# Patient Record
Sex: MALE | Race: WHITE | NOT HISPANIC OR LATINO | Employment: OTHER | URBAN - METROPOLITAN AREA
[De-identification: names, ages, dates, MRNs, and addresses within clinical notes are randomized per-mention and may not be internally consistent; named-entity substitution may affect disease eponyms.]

---

## 2022-01-01 ENCOUNTER — TELEPHONE (OUTPATIENT)
Dept: FAMILY MEDICINE | Facility: CLINIC | Age: 67
End: 2022-01-01
Payer: MEDICARE

## 2022-01-01 ENCOUNTER — HOSPITAL ENCOUNTER (OUTPATIENT)
Dept: CARDIOLOGY | Facility: HOSPITAL | Age: 67
Discharge: HOME | End: 2022-09-19
Attending: INTERNAL MEDICINE
Payer: MEDICARE

## 2022-01-01 ENCOUNTER — HOSPITAL ENCOUNTER (OUTPATIENT)
Dept: RADIOLOGY | Facility: HOSPITAL | Age: 67
Discharge: HOME | End: 2022-09-19
Attending: INTERNAL MEDICINE
Payer: MEDICARE

## 2022-01-01 ENCOUNTER — DOCUMENT SCAN (OUTPATIENT)
Dept: HOME HEALTH SERVICES | Facility: HOSPITAL | Age: 67
End: 2022-01-01
Payer: MEDICARE

## 2022-01-01 ENCOUNTER — OFFICE VISIT (OUTPATIENT)
Dept: FAMILY MEDICINE | Facility: CLINIC | Age: 67
End: 2022-01-01
Payer: MEDICARE

## 2022-01-01 ENCOUNTER — TELEPHONE (OUTPATIENT)
Dept: CARDIOLOGY | Facility: CLINIC | Age: 67
End: 2022-01-01
Payer: MEDICARE

## 2022-01-01 ENCOUNTER — HOSPITAL ENCOUNTER (OUTPATIENT)
Dept: CARDIOLOGY | Facility: HOSPITAL | Age: 67
Discharge: HOME | End: 2022-08-04
Attending: FAMILY MEDICINE
Payer: MEDICARE

## 2022-01-01 ENCOUNTER — OFFICE VISIT (OUTPATIENT)
Dept: CARDIOLOGY | Facility: CLINIC | Age: 67
End: 2022-01-01
Payer: MEDICARE

## 2022-01-01 ENCOUNTER — LAB VISIT (OUTPATIENT)
Dept: LAB | Facility: HOSPITAL | Age: 67
End: 2022-01-01
Attending: FAMILY MEDICINE
Payer: MEDICARE

## 2022-01-01 ENCOUNTER — HOSPITAL ENCOUNTER (EMERGENCY)
Facility: HOSPITAL | Age: 67
Discharge: HOME | End: 2022-06-09
Attending: EMERGENCY MEDICINE
Payer: MEDICARE

## 2022-01-01 ENCOUNTER — HOSPITAL ENCOUNTER (EMERGENCY)
Facility: HOSPITAL | Age: 67
Discharge: HOME | End: 2022-07-15
Attending: FAMILY MEDICINE
Payer: MEDICARE

## 2022-01-01 ENCOUNTER — TELEPHONE (OUTPATIENT)
Dept: HEPATOLOGY | Facility: CLINIC | Age: 67
End: 2022-01-01
Payer: MEDICARE

## 2022-01-01 ENCOUNTER — TELEPHONE (OUTPATIENT)
Dept: RHEUMATOLOGY | Facility: CLINIC | Age: 67
End: 2022-01-01
Payer: MEDICARE

## 2022-01-01 ENCOUNTER — EXTERNAL HOME HEALTH (OUTPATIENT)
Dept: HOME HEALTH SERVICES | Facility: HOSPITAL | Age: 67
End: 2022-01-01
Payer: MEDICARE

## 2022-01-01 ENCOUNTER — HOSPITAL ENCOUNTER (OUTPATIENT)
Dept: RADIOLOGY | Facility: HOSPITAL | Age: 67
Discharge: HOME | End: 2022-09-15
Attending: FAMILY MEDICINE
Payer: MEDICARE

## 2022-01-01 VITALS
BODY MASS INDEX: 21.43 KG/M2 | WEIGHT: 141.38 LBS | HEART RATE: 71 BPM | DIASTOLIC BLOOD PRESSURE: 88 MMHG | SYSTOLIC BLOOD PRESSURE: 138 MMHG | RESPIRATION RATE: 15 BRPM | OXYGEN SATURATION: 96 % | HEIGHT: 68 IN

## 2022-01-01 VITALS
OXYGEN SATURATION: 94 % | SYSTOLIC BLOOD PRESSURE: 134 MMHG | HEIGHT: 68 IN | BODY MASS INDEX: 21.65 KG/M2 | SYSTOLIC BLOOD PRESSURE: 128 MMHG | DIASTOLIC BLOOD PRESSURE: 72 MMHG | SYSTOLIC BLOOD PRESSURE: 135 MMHG | OXYGEN SATURATION: 79 % | DIASTOLIC BLOOD PRESSURE: 77 MMHG | BODY MASS INDEX: 21.44 KG/M2 | HEART RATE: 74 BPM | HEART RATE: 81 BPM | DIASTOLIC BLOOD PRESSURE: 87 MMHG | RESPIRATION RATE: 15 BRPM | WEIGHT: 142.88 LBS | WEIGHT: 141.5 LBS | HEART RATE: 79 BPM | HEIGHT: 68 IN

## 2022-01-01 VITALS
SYSTOLIC BLOOD PRESSURE: 135 MMHG | WEIGHT: 142 LBS | HEART RATE: 72 BPM | BODY MASS INDEX: 21.52 KG/M2 | OXYGEN SATURATION: 98 % | DIASTOLIC BLOOD PRESSURE: 79 MMHG | RESPIRATION RATE: 15 BRPM | HEIGHT: 68 IN

## 2022-01-01 VITALS
BODY MASS INDEX: 23.49 KG/M2 | HEART RATE: 97 BPM | OXYGEN SATURATION: 96 % | TEMPERATURE: 100 F | HEIGHT: 68 IN | SYSTOLIC BLOOD PRESSURE: 126 MMHG | WEIGHT: 155 LBS | RESPIRATION RATE: 19 BRPM | DIASTOLIC BLOOD PRESSURE: 72 MMHG

## 2022-01-01 VITALS
RESPIRATION RATE: 15 BRPM | HEIGHT: 68 IN | BODY MASS INDEX: 21.77 KG/M2 | SYSTOLIC BLOOD PRESSURE: 133 MMHG | DIASTOLIC BLOOD PRESSURE: 69 MMHG | WEIGHT: 143.63 LBS | HEART RATE: 104 BPM | OXYGEN SATURATION: 99 %

## 2022-01-01 VITALS
HEART RATE: 78 BPM | OXYGEN SATURATION: 99 % | DIASTOLIC BLOOD PRESSURE: 89 MMHG | TEMPERATURE: 98 F | WEIGHT: 160 LBS | HEIGHT: 69 IN | SYSTOLIC BLOOD PRESSURE: 165 MMHG | RESPIRATION RATE: 20 BRPM | BODY MASS INDEX: 23.7 KG/M2

## 2022-01-01 VITALS — HEIGHT: 68 IN | WEIGHT: 143 LBS | BODY MASS INDEX: 21.67 KG/M2

## 2022-01-01 VITALS
HEART RATE: 89 BPM | WEIGHT: 144.69 LBS | OXYGEN SATURATION: 96 % | DIASTOLIC BLOOD PRESSURE: 68 MMHG | SYSTOLIC BLOOD PRESSURE: 138 MMHG | BODY MASS INDEX: 21.93 KG/M2 | RESPIRATION RATE: 15 BRPM | HEIGHT: 68 IN

## 2022-01-01 VITALS
HEART RATE: 78 BPM | DIASTOLIC BLOOD PRESSURE: 78 MMHG | SYSTOLIC BLOOD PRESSURE: 134 MMHG | WEIGHT: 143.5 LBS | OXYGEN SATURATION: 98 % | HEIGHT: 68 IN | RESPIRATION RATE: 16 BRPM | BODY MASS INDEX: 21.75 KG/M2

## 2022-01-01 DIAGNOSIS — I49.9 CARDIAC ARRHYTHMIA, UNSPECIFIED CARDIAC ARRHYTHMIA TYPE: ICD-10-CM

## 2022-01-01 DIAGNOSIS — Z86.711 HISTORY OF PULMONARY EMBOLISM: ICD-10-CM

## 2022-01-01 DIAGNOSIS — Z76.0 MEDICATION REFILL: ICD-10-CM

## 2022-01-01 DIAGNOSIS — I10 PRIMARY HYPERTENSION: ICD-10-CM

## 2022-01-01 DIAGNOSIS — D50.0 IRON DEFICIENCY ANEMIA DUE TO CHRONIC BLOOD LOSS: ICD-10-CM

## 2022-01-01 DIAGNOSIS — K75.9 HEPATITIS: ICD-10-CM

## 2022-01-01 DIAGNOSIS — R73.9 HYPERGLYCEMIA: ICD-10-CM

## 2022-01-01 DIAGNOSIS — J40 BRONCHITIS: ICD-10-CM

## 2022-01-01 DIAGNOSIS — M25.50 ARTHRALGIA, UNSPECIFIED JOINT: ICD-10-CM

## 2022-01-01 DIAGNOSIS — I73.9 CLAUDICATION: ICD-10-CM

## 2022-01-01 DIAGNOSIS — Z11.59 NEED FOR HEPATITIS C SCREENING TEST: ICD-10-CM

## 2022-01-01 DIAGNOSIS — J32.9 SINUSITIS, UNSPECIFIED CHRONICITY, UNSPECIFIED LOCATION: ICD-10-CM

## 2022-01-01 DIAGNOSIS — Z12.11 COLON CANCER SCREENING: ICD-10-CM

## 2022-01-01 DIAGNOSIS — B30.9 VIRAL CONJUNCTIVITIS OF BOTH EYES: Primary | ICD-10-CM

## 2022-01-01 DIAGNOSIS — E78.5 DYSLIPIDEMIA: ICD-10-CM

## 2022-01-01 DIAGNOSIS — J42 CHRONIC BRONCHITIS, UNSPECIFIED CHRONIC BRONCHITIS TYPE: ICD-10-CM

## 2022-01-01 DIAGNOSIS — I34.0 NONRHEUMATIC MITRAL VALVE REGURGITATION: ICD-10-CM

## 2022-01-01 DIAGNOSIS — R63.4 WEIGHT LOSS, UNINTENTIONAL: ICD-10-CM

## 2022-01-01 DIAGNOSIS — B18.2 CHRONIC HEPATITIS C WITHOUT HEPATIC COMA: ICD-10-CM

## 2022-01-01 DIAGNOSIS — Z91.89 AT RISK FOR CARDIOVASCULAR EVENT: ICD-10-CM

## 2022-01-01 DIAGNOSIS — I10 PRIMARY HYPERTENSION: Primary | ICD-10-CM

## 2022-01-01 DIAGNOSIS — Z02.83 ENCOUNTER FOR DRUG SCREENING: ICD-10-CM

## 2022-01-01 DIAGNOSIS — Z87.891 HISTORY OF SMOKING GREATER THAN 50 PACK YEARS: ICD-10-CM

## 2022-01-01 DIAGNOSIS — M06.9 RHEUMATOID ARTHRITIS, INVOLVING UNSPECIFIED SITE, UNSPECIFIED WHETHER RHEUMATOID FACTOR PRESENT: Primary | ICD-10-CM

## 2022-01-01 DIAGNOSIS — D64.9 ANEMIA, UNSPECIFIED TYPE: ICD-10-CM

## 2022-01-01 DIAGNOSIS — R06.02 SOB (SHORTNESS OF BREATH): ICD-10-CM

## 2022-01-01 DIAGNOSIS — R29.6 MULTIPLE FALLS: ICD-10-CM

## 2022-01-01 DIAGNOSIS — B18.2 CHRONIC HEPATITIS C WITHOUT HEPATIC COMA: Primary | ICD-10-CM

## 2022-01-01 DIAGNOSIS — F11.20 UNCOMPLICATED OPIOID DEPENDENCE: ICD-10-CM

## 2022-01-01 DIAGNOSIS — U07.1 COVID-19: ICD-10-CM

## 2022-01-01 DIAGNOSIS — R91.8 LUNG MASS: ICD-10-CM

## 2022-01-01 DIAGNOSIS — G47.19 EXCESSIVE DAYTIME SLEEPINESS: ICD-10-CM

## 2022-01-01 DIAGNOSIS — Z79.01 LONG TERM (CURRENT) USE OF ANTICOAGULANTS: ICD-10-CM

## 2022-01-01 DIAGNOSIS — R94.31 NONSPECIFIC ABNORMAL ELECTROCARDIOGRAM (ECG) (EKG): ICD-10-CM

## 2022-01-01 DIAGNOSIS — I73.9 CLAUDICATION: Primary | ICD-10-CM

## 2022-01-01 DIAGNOSIS — M05.79 RHEUMATOID ARTHRITIS INVOLVING MULTIPLE SITES WITH POSITIVE RHEUMATOID FACTOR: ICD-10-CM

## 2022-01-01 DIAGNOSIS — Z91.89 SEDENTARY LIFESTYLE: ICD-10-CM

## 2022-01-01 DIAGNOSIS — R06.02 SHORTNESS OF BREATH: ICD-10-CM

## 2022-01-01 DIAGNOSIS — J44.1 COPD EXACERBATION: Primary | ICD-10-CM

## 2022-01-01 DIAGNOSIS — J06.9 UPPER RESPIRATORY TRACT INFECTION, UNSPECIFIED TYPE: ICD-10-CM

## 2022-01-01 DIAGNOSIS — F32.A DEPRESSION, UNSPECIFIED DEPRESSION TYPE: Primary | ICD-10-CM

## 2022-01-01 DIAGNOSIS — F32.A DEPRESSION, UNSPECIFIED DEPRESSION TYPE: ICD-10-CM

## 2022-01-01 DIAGNOSIS — Z82.49 FAMILY HISTORY OF PREMATURE CAD: ICD-10-CM

## 2022-01-01 DIAGNOSIS — I49.9 CARDIAC ARRHYTHMIA, UNSPECIFIED CARDIAC ARRHYTHMIA TYPE: Primary | ICD-10-CM

## 2022-01-01 LAB
ALBUMIN SERPL BCP-MCNC: 3.2 G/DL (ref 3.5–5.2)
ALBUMIN SERPL BCP-MCNC: 3.5 G/DL (ref 3.5–5.2)
ALBUMIN SERPL BCP-MCNC: 3.6 G/DL (ref 3.5–5.2)
ALP SERPL-CCNC: 102 U/L (ref 55–135)
ALP SERPL-CCNC: 93 U/L (ref 55–135)
ALP SERPL-CCNC: 99 U/L (ref 55–135)
ALT SERPL W/O P-5'-P-CCNC: 37 U/L (ref 10–44)
ALT SERPL W/O P-5'-P-CCNC: 40 U/L (ref 10–44)
ALT SERPL W/O P-5'-P-CCNC: 52 U/L (ref 10–44)
ANION GAP SERPL CALC-SCNC: 12 MMOL/L (ref 8–16)
ANION GAP SERPL CALC-SCNC: 13 MMOL/L (ref 8–16)
ANION GAP SERPL CALC-SCNC: 18 MMOL/L (ref 8–16)
AORTIC ROOT ANNULUS: 3.01 CM
AST SERPL-CCNC: 37 U/L (ref 10–40)
AST SERPL-CCNC: 39 U/L (ref 10–40)
AST SERPL-CCNC: 49 U/L (ref 10–40)
AV INDEX (PROSTH): 0.7
AV MEAN GRADIENT: 8 MMHG
AV PEAK GRADIENT: 16 MMHG
AV VELOCITY RATIO: 0.73
BACTERIA BLD CULT: NORMAL
BACTERIA BLD CULT: NORMAL
BASOPHILS # BLD AUTO: 0.06 K/UL (ref 0–0.2)
BASOPHILS # BLD AUTO: 0.07 K/UL (ref 0–0.2)
BASOPHILS # BLD AUTO: 0.07 K/UL (ref 0–0.2)
BASOPHILS NFR BLD: 0.4 % (ref 0–1.9)
BASOPHILS NFR BLD: 0.6 % (ref 0–1.9)
BASOPHILS NFR BLD: 0.7 % (ref 0–1.9)
BILIRUB SERPL-MCNC: 0.3 MG/DL (ref 0.1–1)
BILIRUB SERPL-MCNC: 0.4 MG/DL (ref 0.1–1)
BILIRUB SERPL-MCNC: 0.7 MG/DL (ref 0.1–1)
BNP SERPL-MCNC: 82 PG/ML (ref 0–99)
BSA FOR ECHO PROCEDURE: 1.76 M2
BUN SERPL-MCNC: 6 MG/DL (ref 8–23)
BUN SERPL-MCNC: 8 MG/DL (ref 8–23)
BUN SERPL-MCNC: 8 MG/DL (ref 8–23)
CALCIUM SERPL-MCNC: 9.3 MG/DL (ref 8.7–10.5)
CALCIUM SERPL-MCNC: 9.6 MG/DL (ref 8.7–10.5)
CALCIUM SERPL-MCNC: 9.7 MG/DL (ref 8.7–10.5)
CHLORIDE SERPL-SCNC: 102 MMOL/L (ref 95–110)
CHLORIDE SERPL-SCNC: 91 MMOL/L (ref 95–110)
CHLORIDE SERPL-SCNC: 95 MMOL/L (ref 95–110)
CHOLEST SERPL-MCNC: 169 MG/DL (ref 120–199)
CHOLEST/HDLC SERPL: 4.1 {RATIO} (ref 2–5)
CO2 SERPL-SCNC: 22 MMOL/L (ref 23–29)
CO2 SERPL-SCNC: 22 MMOL/L (ref 23–29)
CO2 SERPL-SCNC: 23 MMOL/L (ref 23–29)
CREAT SERPL-MCNC: 0.7 MG/DL (ref 0.5–1.4)
CREAT SERPL-MCNC: 0.8 MG/DL (ref 0.5–1.4)
CREAT SERPL-MCNC: 0.8 MG/DL (ref 0.5–1.4)
CRP SERPL-MCNC: 29.6 MG/L (ref 0–8.2)
CV ECHO LV RWT: 0.43 CM
CV STRESS BASE HR: 68 BPM
DIASTOLIC BLOOD PRESSURE: 76 MMHG
DIFFERENTIAL METHOD: ABNORMAL
DOP CALC AO PEAK VEL: 1.98 M/S
DOP CALC AO VTI: 46.97 CM
DOP CALC LVOT PEAK VEL: 1.45 M/S
DOP CALCLVOT PEAK VEL VTI: 33.05 CM
E WAVE DECELERATION TIME: 156.43 MSEC
ECHO LV POSTERIOR WALL: 0.98 CM (ref 0.6–1.1)
EJECTION FRACTION- HIGH: 65 %
EJECTION FRACTION: 56 %
END DIASTOLIC INDEX-HIGH: 153 ML/M2
END DIASTOLIC INDEX-LOW: 93 ML/M2
END SYSTOLIC INDEX-HIGH: 71 ML/M2
END SYSTOLIC INDEX-LOW: 31 ML/M2
EOSINOPHIL # BLD AUTO: 1.7 K/UL (ref 0–0.5)
EOSINOPHIL # BLD AUTO: 2 K/UL (ref 0–0.5)
EOSINOPHIL # BLD AUTO: 2.3 K/UL (ref 0–0.5)
EOSINOPHIL NFR BLD: 20.8 % (ref 0–8)
EOSINOPHIL NFR BLD: 23.1 % (ref 0–8)
EOSINOPHIL NFR BLD: 9.1 % (ref 0–8)
ERYTHROCYTE [DISTWIDTH] IN BLOOD BY AUTOMATED COUNT: 13.2 % (ref 11.5–14.5)
ERYTHROCYTE [DISTWIDTH] IN BLOOD BY AUTOMATED COUNT: 14.1 % (ref 11.5–14.5)
ERYTHROCYTE [DISTWIDTH] IN BLOOD BY AUTOMATED COUNT: 14.3 % (ref 11.5–14.5)
ERYTHROCYTE [SEDIMENTATION RATE] IN BLOOD BY WESTERGREN METHOD: >90 MM/HR (ref 0–10)
EST. GFR  (AFRICAN AMERICAN): >60 ML/MIN/1.73 M^2
EST. GFR  (NON AFRICAN AMERICAN): >60 ML/MIN/1.73 M^2
ESTIMATED AVG GLUCOSE: 111 MG/DL (ref 68–131)
FRACTIONAL SHORTENING: 29 % (ref 28–44)
GLUCOSE SERPL-MCNC: 101 MG/DL (ref 70–110)
GLUCOSE SERPL-MCNC: 119 MG/DL (ref 70–110)
GLUCOSE SERPL-MCNC: 82 MG/DL (ref 70–110)
HBA1C MFR BLD: 5.5 % (ref 4–5.6)
HBV DNA SERPL QL NAA+PROBE: NOT DETECTED
HCT VFR BLD AUTO: 35.3 % (ref 40–54)
HCT VFR BLD AUTO: 38.3 % (ref 40–54)
HCT VFR BLD AUTO: 38.7 % (ref 40–54)
HCV AB SERPL QL IA: POSITIVE
HCV RNA SERPL NAA+PROBE-ACNC: ABNORMAL IU/ML
HDLC SERPL-MCNC: 41 MG/DL (ref 40–75)
HDLC SERPL: 24.3 % (ref 20–50)
HGB BLD-MCNC: 12.3 G/DL (ref 14–18)
HGB BLD-MCNC: 12.8 G/DL (ref 14–18)
HGB BLD-MCNC: 13.3 G/DL (ref 14–18)
IMM GRANULOCYTES # BLD AUTO: 0.02 K/UL (ref 0–0.04)
IMM GRANULOCYTES # BLD AUTO: 0.02 K/UL (ref 0–0.04)
IMM GRANULOCYTES # BLD AUTO: 0.09 K/UL (ref 0–0.04)
IMM GRANULOCYTES NFR BLD AUTO: 0.2 % (ref 0–0.5)
IMM GRANULOCYTES NFR BLD AUTO: 0.2 % (ref 0–0.5)
IMM GRANULOCYTES NFR BLD AUTO: 0.5 % (ref 0–0.5)
INTERVENTRICULAR SEPTUM: 0.96 CM (ref 0.6–1.1)
LACTATE SERPL-SCNC: 1.3 MMOL/L (ref 0.5–2.2)
LDLC SERPL CALC-MCNC: 113 MG/DL (ref 63–159)
LEFT ATRIUM SIZE: 4.12 CM
LEFT INTERNAL DIMENSION IN SYSTOLE: 3.25 CM (ref 2.1–4)
LEFT VENTRICLE DIASTOLIC VOLUME INDEX: 54.38 ML/M2
LEFT VENTRICLE DIASTOLIC VOLUME: 96.25 ML
LEFT VENTRICLE MASS INDEX: 85 G/M2
LEFT VENTRICLE SYSTOLIC VOLUME INDEX: 24 ML/M2
LEFT VENTRICLE SYSTOLIC VOLUME: 42.41 ML
LEFT VENTRICULAR INTERNAL DIMENSION IN DIASTOLE: 4.58 CM (ref 3.5–6)
LEFT VENTRICULAR MASS: 151.27 G
LYMPHOCYTES # BLD AUTO: 1 K/UL (ref 1–4.8)
LYMPHOCYTES # BLD AUTO: 1.3 K/UL (ref 1–4.8)
LYMPHOCYTES # BLD AUTO: 2 K/UL (ref 1–4.8)
LYMPHOCYTES NFR BLD: 12.1 % (ref 18–48)
LYMPHOCYTES NFR BLD: 22.6 % (ref 18–48)
LYMPHOCYTES NFR BLD: 5.6 % (ref 18–48)
MCH RBC QN AUTO: 29.4 PG (ref 27–31)
MCH RBC QN AUTO: 29.6 PG (ref 27–31)
MCH RBC QN AUTO: 30.2 PG (ref 27–31)
MCHC RBC AUTO-ENTMCNC: 33.4 G/DL (ref 32–36)
MCHC RBC AUTO-ENTMCNC: 34.4 G/DL (ref 32–36)
MCHC RBC AUTO-ENTMCNC: 34.8 G/DL (ref 32–36)
MCV RBC AUTO: 85 FL (ref 82–98)
MCV RBC AUTO: 88 FL (ref 82–98)
MCV RBC AUTO: 88 FL (ref 82–98)
MONOCYTES # BLD AUTO: 0.7 K/UL (ref 0.3–1)
MONOCYTES # BLD AUTO: 1.1 K/UL (ref 0.3–1)
MONOCYTES # BLD AUTO: 1.3 K/UL (ref 0.3–1)
MONOCYTES NFR BLD: 12.5 % (ref 4–15)
MONOCYTES NFR BLD: 6.8 % (ref 4–15)
MONOCYTES NFR BLD: 7.1 % (ref 4–15)
MV STENOSIS PRESSURE HALF TIME: 45.36 MS
MV VALVE AREA P 1/2 METHOD: 4.85 CM2
NEUTROPHILS # BLD AUTO: 14.2 K/UL (ref 1.8–7.7)
NEUTROPHILS # BLD AUTO: 3.6 K/UL (ref 1.8–7.7)
NEUTROPHILS # BLD AUTO: 6.5 K/UL (ref 1.8–7.7)
NEUTROPHILS NFR BLD: 40.9 % (ref 38–73)
NEUTROPHILS NFR BLD: 59.5 % (ref 38–73)
NEUTROPHILS NFR BLD: 77.3 % (ref 38–73)
NONHDLC SERPL-MCNC: 128 MG/DL
NRBC BLD-RTO: 0 /100 WBC
NUC REST DIASTOLIC VOLUME INDEX: 70
NUC REST EJECTION FRACTION: 61
NUC REST SYSTOLIC VOLUME INDEX: 27
NUC STRESS DIASTOLIC VOLUME INDEX: 68
NUC STRESS EJECTION FRACTION: 65 %
NUC STRESS SYSTOLIC VOLUME INDEX: 24
OHS CV CPX 85 PERCENT MAX PREDICTED HEART RATE MALE: 129
OHS CV CPX MAX PREDICTED HEART RATE: 152
OHS CV CPX PATIENT IS FEMALE: 0
OHS CV CPX PATIENT IS MALE: 1
OHS CV CPX PEAK DIASTOLIC BLOOD PRESSURE: 80 MMHG
OHS CV CPX PEAK HEAR RATE: 82 BPM
OHS CV CPX PEAK RATE PRESSURE PRODUCT: NORMAL
OHS CV CPX PEAK SYSTOLIC BLOOD PRESSURE: 134 MMHG
OHS CV CPX PERCENT MAX PREDICTED HEART RATE ACHIEVED: 54
OHS CV CPX RATE PRESSURE PRODUCT PRESENTING: 8976
OHS CV EVENT MONITOR DAY: 0
OHS CV HOLTER LENGTH DECIMAL HOURS: 47.98
OHS CV HOLTER LENGTH HOURS: 47
OHS CV HOLTER LENGTH MINUTES: 59
OHS CV HOLTER SINUS AVERAGE HR: 71
OHS CV HOLTER SINUS MAX HR: 111
OHS CV HOLTER SINUS MIN HR: 54
PISA MRMAX VEL: 0.05 M/S
PLATELET # BLD AUTO: 159 K/UL (ref 150–450)
PLATELET # BLD AUTO: 250 K/UL (ref 150–450)
PLATELET # BLD AUTO: 374 K/UL (ref 150–450)
PMV BLD AUTO: 8.4 FL (ref 9.2–12.9)
PMV BLD AUTO: 8.6 FL (ref 9.2–12.9)
PMV BLD AUTO: 9 FL (ref 9.2–12.9)
POTASSIUM SERPL-SCNC: 3.5 MMOL/L (ref 3.5–5.1)
POTASSIUM SERPL-SCNC: 3.8 MMOL/L (ref 3.5–5.1)
POTASSIUM SERPL-SCNC: 4 MMOL/L (ref 3.5–5.1)
PROT SERPL-MCNC: 7.8 G/DL (ref 6–8.4)
PROT SERPL-MCNC: 8.4 G/DL (ref 6–8.4)
PROT SERPL-MCNC: 8.9 G/DL (ref 6–8.4)
PV PEAK VELOCITY: 0.81 CM/S
RA PRESSURE: 3 MMHG
RBC # BLD AUTO: 4.15 M/UL (ref 4.6–6.2)
RBC # BLD AUTO: 4.35 M/UL (ref 4.6–6.2)
RBC # BLD AUTO: 4.41 M/UL (ref 4.6–6.2)
RETIRED EF AND QEF - SEE NOTES: 53 %
RHEUMATOID FACT SERPL-ACNC: 295 IU/ML (ref 0–15)
SARS-COV-2 RDRP RESP QL NAA+PROBE: NEGATIVE
SARS-COV-2 RDRP RESP QL NAA+PROBE: NEGATIVE
SODIUM SERPL-SCNC: 127 MMOL/L (ref 136–145)
SODIUM SERPL-SCNC: 135 MMOL/L (ref 136–145)
SODIUM SERPL-SCNC: 136 MMOL/L (ref 136–145)
SYSTOLIC BLOOD PRESSURE: 132 MMHG
T4 FREE SERPL-MCNC: 1.06 NG/DL (ref 0.71–1.51)
TRIGL SERPL-MCNC: 75 MG/DL (ref 30–150)
TSH SERPL DL<=0.005 MIU/L-ACNC: 2.51 UIU/ML (ref 0.4–4)
URATE SERPL-MCNC: 2.2 MG/DL (ref 3.4–7)
WBC # BLD AUTO: 10.89 K/UL (ref 3.9–12.7)
WBC # BLD AUTO: 18.32 K/UL (ref 3.9–12.7)
WBC # BLD AUTO: 8.7 K/UL (ref 3.9–12.7)

## 2022-01-01 PROCEDURE — 96374 THER/PROPH/DIAG INJ IV PUSH: CPT

## 2022-01-01 PROCEDURE — 25000003 PHARM REV CODE 250: Performed by: EMERGENCY MEDICINE

## 2022-01-01 PROCEDURE — 36415 COLL VENOUS BLD VENIPUNCTURE: CPT | Performed by: FAMILY MEDICINE

## 2022-01-01 PROCEDURE — 99205 PR OFFICE/OUTPT VISIT, NEW, LEVL V, 60-74 MIN: ICD-10-PCS | Mod: S$PBB,CR,25, | Performed by: INTERNAL MEDICINE

## 2022-01-01 PROCEDURE — 99999 PR PBB SHADOW E&M-EST. PATIENT-LVL IV: CPT | Mod: PBBFAC,,, | Performed by: FAMILY MEDICINE

## 2022-01-01 PROCEDURE — 99214 PR OFFICE/OUTPT VISIT, EST, LEVL IV, 30-39 MIN: ICD-10-PCS | Mod: S$PBB,,, | Performed by: FAMILY MEDICINE

## 2022-01-01 PROCEDURE — 71045 X-RAY EXAM CHEST 1 VIEW: CPT | Mod: 26,,, | Performed by: RADIOLOGY

## 2022-01-01 PROCEDURE — 99205 OFFICE O/P NEW HI 60 MIN: CPT | Mod: S$PBB,CR,25, | Performed by: INTERNAL MEDICINE

## 2022-01-01 PROCEDURE — 93017 CV STRESS TEST TRACING ONLY: CPT

## 2022-01-01 PROCEDURE — 99204 OFFICE O/P NEW MOD 45 MIN: CPT | Mod: S$PBB,,, | Performed by: FAMILY MEDICINE

## 2022-01-01 PROCEDURE — 93306 TTE W/DOPPLER COMPLETE: CPT

## 2022-01-01 PROCEDURE — 86803 HEPATITIS C AB TEST: CPT | Performed by: FAMILY MEDICINE

## 2022-01-01 PROCEDURE — 99999 PR PBB SHADOW E&M-EST. PATIENT-LVL V: ICD-10-PCS | Mod: PBBFAC,CR,, | Performed by: INTERNAL MEDICINE

## 2022-01-01 PROCEDURE — 93010 EKG 12-LEAD: ICD-10-PCS | Mod: ,,, | Performed by: INTERNAL MEDICINE

## 2022-01-01 PROCEDURE — 87516 HEPATITIS B DNA AMP PROBE: CPT | Performed by: FAMILY MEDICINE

## 2022-01-01 PROCEDURE — 93306 TTE W/DOPPLER COMPLETE: CPT | Mod: 26,,, | Performed by: INTERNAL MEDICINE

## 2022-01-01 PROCEDURE — 93005 ELECTROCARDIOGRAM TRACING: CPT

## 2022-01-01 PROCEDURE — 93225 XTRNL ECG REC<48 HRS REC: CPT

## 2022-01-01 PROCEDURE — 99999 PR PBB SHADOW E&M-EST. PATIENT-LVL V: CPT | Mod: PBBFAC,CR,, | Performed by: INTERNAL MEDICINE

## 2022-01-01 PROCEDURE — 99214 OFFICE O/P EST MOD 30 MIN: CPT | Mod: S$PBB,,, | Performed by: FAMILY MEDICINE

## 2022-01-01 PROCEDURE — U0002 COVID-19 LAB TEST NON-CDC: HCPCS | Performed by: EMERGENCY MEDICINE

## 2022-01-01 PROCEDURE — 96372 THER/PROPH/DIAG INJ SC/IM: CPT | Mod: PBBFAC,PN

## 2022-01-01 PROCEDURE — 99999 PR PBB SHADOW E&M-EST. PATIENT-LVL III: ICD-10-PCS | Mod: PBBFAC,,, | Performed by: FAMILY MEDICINE

## 2022-01-01 PROCEDURE — 93010 EKG 12-LEAD: ICD-10-PCS | Mod: S$PBB,,, | Performed by: INTERNAL MEDICINE

## 2022-01-01 PROCEDURE — 96360 HYDRATION IV INFUSION INIT: CPT

## 2022-01-01 PROCEDURE — 93306 ECHO (CUPID ONLY): ICD-10-PCS | Mod: 26,,, | Performed by: INTERNAL MEDICINE

## 2022-01-01 PROCEDURE — 93016 CV STRESS TEST SUPVJ ONLY: CPT | Mod: ,,, | Performed by: INTERNAL MEDICINE

## 2022-01-01 PROCEDURE — 93005 ELECTROCARDIOGRAM TRACING: CPT | Mod: PBBFAC,PN | Performed by: INTERNAL MEDICINE

## 2022-01-01 PROCEDURE — 93010 ELECTROCARDIOGRAM REPORT: CPT | Mod: S$PBB,,, | Performed by: INTERNAL MEDICINE

## 2022-01-01 PROCEDURE — 78452 HT MUSCLE IMAGE SPECT MULT: CPT | Mod: 26,,, | Performed by: INTERNAL MEDICINE

## 2022-01-01 PROCEDURE — 93010 ELECTROCARDIOGRAM REPORT: CPT | Mod: ,,, | Performed by: INTERNAL MEDICINE

## 2022-01-01 PROCEDURE — 93018 CV STRESS TEST I&R ONLY: CPT | Mod: ,,, | Performed by: INTERNAL MEDICINE

## 2022-01-01 PROCEDURE — U0002 COVID-19 LAB TEST NON-CDC: HCPCS | Performed by: FAMILY MEDICINE

## 2022-01-01 PROCEDURE — 93016 STRESS TEST WITH MYOCARDIAL PERFUSION (CUPID ONLY): ICD-10-PCS | Mod: ,,, | Performed by: INTERNAL MEDICINE

## 2022-01-01 PROCEDURE — 93227 HOLTER MONITOR - 48 HOUR (CUPID ONLY): ICD-10-PCS | Mod: ,,, | Performed by: INTERNAL MEDICINE

## 2022-01-01 PROCEDURE — 63600175 PHARM REV CODE 636 W HCPCS: Performed by: INTERNAL MEDICINE

## 2022-01-01 PROCEDURE — 84550 ASSAY OF BLOOD/URIC ACID: CPT | Performed by: FAMILY MEDICINE

## 2022-01-01 PROCEDURE — 85025 COMPLETE CBC W/AUTO DIFF WBC: CPT | Performed by: FAMILY MEDICINE

## 2022-01-01 PROCEDURE — 99285 EMERGENCY DEPT VISIT HI MDM: CPT | Mod: 25

## 2022-01-01 PROCEDURE — 71045 XR CHEST 1 VIEW: ICD-10-PCS | Mod: 26,,, | Performed by: RADIOLOGY

## 2022-01-01 PROCEDURE — 25000003 PHARM REV CODE 250: Performed by: FAMILY MEDICINE

## 2022-01-01 PROCEDURE — 86140 C-REACTIVE PROTEIN: CPT | Performed by: FAMILY MEDICINE

## 2022-01-01 PROCEDURE — 84443 ASSAY THYROID STIM HORMONE: CPT | Performed by: FAMILY MEDICINE

## 2022-01-01 PROCEDURE — 71045 XR CHEST AP PORTABLE: ICD-10-PCS | Mod: 26,,, | Performed by: RADIOLOGY

## 2022-01-01 PROCEDURE — 25000242 PHARM REV CODE 250 ALT 637 W/ HCPCS: Performed by: EMERGENCY MEDICINE

## 2022-01-01 PROCEDURE — 99999 PR PBB SHADOW E&M-EST. PATIENT-LVL IV: ICD-10-PCS | Mod: PBBFAC,,, | Performed by: FAMILY MEDICINE

## 2022-01-01 PROCEDURE — 93018 STRESS TEST WITH MYOCARDIAL PERFUSION (CUPID ONLY): ICD-10-PCS | Mod: ,,, | Performed by: INTERNAL MEDICINE

## 2022-01-01 PROCEDURE — 87040 BLOOD CULTURE FOR BACTERIA: CPT | Mod: 59 | Performed by: FAMILY MEDICINE

## 2022-01-01 PROCEDURE — 99214 OFFICE O/P EST MOD 30 MIN: CPT | Mod: PBBFAC,25,PN | Performed by: FAMILY MEDICINE

## 2022-01-01 PROCEDURE — 99213 OFFICE O/P EST LOW 20 MIN: CPT | Mod: PBBFAC,PN | Performed by: FAMILY MEDICINE

## 2022-01-01 PROCEDURE — 80053 COMPREHEN METABOLIC PANEL: CPT | Performed by: EMERGENCY MEDICINE

## 2022-01-01 PROCEDURE — 99999 PR PBB SHADOW E&M-EST. PATIENT-LVL III: CPT | Mod: PBBFAC,,, | Performed by: FAMILY MEDICINE

## 2022-01-01 PROCEDURE — 86431 RHEUMATOID FACTOR QUANT: CPT | Performed by: FAMILY MEDICINE

## 2022-01-01 PROCEDURE — 99204 PR OFFICE/OUTPT VISIT, NEW, LEVL IV, 45-59 MIN: ICD-10-PCS | Mod: S$PBB,,, | Performed by: FAMILY MEDICINE

## 2022-01-01 PROCEDURE — 71271 CT CHEST LUNG SCREENING LOW DOSE: ICD-10-PCS | Mod: 26,,, | Performed by: RADIOLOGY

## 2022-01-01 PROCEDURE — 85651 RBC SED RATE NONAUTOMATED: CPT | Performed by: FAMILY MEDICINE

## 2022-01-01 PROCEDURE — A9500 TC99M SESTAMIBI: HCPCS

## 2022-01-01 PROCEDURE — 93925 LOWER EXTREMITY STUDY: CPT | Mod: TC

## 2022-01-01 PROCEDURE — 85025 COMPLETE CBC W/AUTO DIFF WBC: CPT | Performed by: EMERGENCY MEDICINE

## 2022-01-01 PROCEDURE — 80326 AMPHETAMINES 5 OR MORE: CPT | Performed by: FAMILY MEDICINE

## 2022-01-01 PROCEDURE — 83880 ASSAY OF NATRIURETIC PEPTIDE: CPT | Performed by: EMERGENCY MEDICINE

## 2022-01-01 PROCEDURE — 80053 COMPREHEN METABOLIC PANEL: CPT | Performed by: FAMILY MEDICINE

## 2022-01-01 PROCEDURE — 71271 CT THORAX LUNG CANCER SCR C-: CPT | Mod: 26,,, | Performed by: RADIOLOGY

## 2022-01-01 PROCEDURE — 94640 AIRWAY INHALATION TREATMENT: CPT

## 2022-01-01 PROCEDURE — 71045 X-RAY EXAM CHEST 1 VIEW: CPT | Mod: TC

## 2022-01-01 PROCEDURE — 93227 XTRNL ECG REC<48 HR R&I: CPT | Mod: ,,, | Performed by: INTERNAL MEDICINE

## 2022-01-01 PROCEDURE — 63600175 PHARM REV CODE 636 W HCPCS: Performed by: EMERGENCY MEDICINE

## 2022-01-01 PROCEDURE — G0180 MD CERTIFICATION HHA PATIENT: HCPCS | Mod: ,,, | Performed by: FAMILY MEDICINE

## 2022-01-01 PROCEDURE — 84439 ASSAY OF FREE THYROXINE: CPT | Performed by: FAMILY MEDICINE

## 2022-01-01 PROCEDURE — 99214 OFFICE O/P EST MOD 30 MIN: CPT | Mod: 25,PBBFAC,PN | Performed by: FAMILY MEDICINE

## 2022-01-01 PROCEDURE — 83036 HEMOGLOBIN GLYCOSYLATED A1C: CPT | Performed by: FAMILY MEDICINE

## 2022-01-01 PROCEDURE — 78452 STRESS TEST WITH MYOCARDIAL PERFUSION (CUPID ONLY): ICD-10-PCS | Mod: 26,,, | Performed by: INTERNAL MEDICINE

## 2022-01-01 PROCEDURE — G0180 PR HOME HEALTH MD CERTIFICATION: ICD-10-PCS | Mod: ,,, | Performed by: FAMILY MEDICINE

## 2022-01-01 PROCEDURE — 80061 LIPID PANEL: CPT | Performed by: FAMILY MEDICINE

## 2022-01-01 PROCEDURE — 71045 X-RAY EXAM CHEST 1 VIEW: CPT | Mod: TC,FY

## 2022-01-01 PROCEDURE — 71271 CT THORAX LUNG CANCER SCR C-: CPT | Mod: TC

## 2022-01-01 PROCEDURE — 99215 OFFICE O/P EST HI 40 MIN: CPT | Mod: PBBFAC,PN | Performed by: INTERNAL MEDICINE

## 2022-01-01 PROCEDURE — 93925 LOWER EXTREMITY STUDY: CPT | Mod: 26,,, | Performed by: RADIOLOGY

## 2022-01-01 PROCEDURE — 93925 US LOWER EXTREMITY ARTERIES BILATERAL: ICD-10-PCS | Mod: 26,,, | Performed by: RADIOLOGY

## 2022-01-01 PROCEDURE — 83605 ASSAY OF LACTIC ACID: CPT | Performed by: FAMILY MEDICINE

## 2022-01-01 RX ORDER — HYDROCHLOROTHIAZIDE 25 MG/1
25 TABLET ORAL DAILY
COMMUNITY
End: 2022-01-01 | Stop reason: SDUPTHER

## 2022-01-01 RX ORDER — OMEPRAZOLE 20 MG/1
20 CAPSULE, DELAYED RELEASE ORAL DAILY
Qty: 30 CAPSULE | Refills: 0 | Status: SHIPPED | OUTPATIENT
Start: 2022-01-01 | End: 2022-01-01 | Stop reason: SDUPTHER

## 2022-01-01 RX ORDER — SOTALOL HYDROCHLORIDE 80 MG/1
80 TABLET ORAL 2 TIMES DAILY
Qty: 60 TABLET | Refills: 11 | Status: SHIPPED | OUTPATIENT
Start: 2022-01-01 | End: 2022-01-01

## 2022-01-01 RX ORDER — HYDROCODONE BITARTRATE AND ACETAMINOPHEN 10; 325 MG/1; MG/1
1 TABLET ORAL EVERY 6 HOURS PRN
Qty: 120 TABLET | Refills: 0 | Status: SHIPPED | OUTPATIENT
Start: 2022-01-01 | End: 2022-01-01 | Stop reason: SDUPTHER

## 2022-01-01 RX ORDER — SERTRALINE HYDROCHLORIDE 100 MG/1
100 TABLET, FILM COATED ORAL DAILY
Qty: 30 TABLET | Refills: 11 | Status: SHIPPED | OUTPATIENT
Start: 2022-01-01 | End: 2022-01-01 | Stop reason: SDUPTHER

## 2022-01-01 RX ORDER — DOXYCYCLINE HYCLATE 100 MG
100 TABLET ORAL 2 TIMES DAILY
Qty: 20 TABLET | Refills: 0 | Status: SHIPPED | OUTPATIENT
Start: 2022-01-01 | End: 2022-01-01

## 2022-01-01 RX ORDER — CYANOCOBALAMIN 1000 UG/ML
1000 INJECTION, SOLUTION INTRAMUSCULAR; SUBCUTANEOUS ONCE
Status: SHIPPED | OUTPATIENT
Start: 2022-01-01

## 2022-01-01 RX ORDER — ALBUTEROL SULFATE 90 UG/1
2 AEROSOL, METERED RESPIRATORY (INHALATION)
COMMUNITY
Start: 2022-01-01 | End: 2022-01-01 | Stop reason: SDUPTHER

## 2022-01-01 RX ORDER — DEXAMETHASONE SODIUM PHOSPHATE 100 MG/10ML
8 INJECTION INTRAMUSCULAR; INTRAVENOUS
Status: DISCONTINUED | OUTPATIENT
Start: 2022-01-01 | End: 2022-01-01

## 2022-01-01 RX ORDER — CEFTRIAXONE 1 G/1
1 INJECTION, POWDER, FOR SOLUTION INTRAMUSCULAR; INTRAVENOUS
Status: COMPLETED | OUTPATIENT
Start: 2022-01-01 | End: 2022-01-01

## 2022-01-01 RX ORDER — PROMETHAZINE HYDROCHLORIDE AND DEXTROMETHORPHAN HYDROBROMIDE 6.25; 15 MG/5ML; MG/5ML
5 SYRUP ORAL EVERY 6 HOURS PRN
Qty: 240 ML | Refills: 1 | Status: SHIPPED | OUTPATIENT
Start: 2022-01-01

## 2022-01-01 RX ORDER — IPRATROPIUM BROMIDE AND ALBUTEROL SULFATE 2.5; .5 MG/3ML; MG/3ML
3 SOLUTION RESPIRATORY (INHALATION)
Status: COMPLETED | OUTPATIENT
Start: 2022-01-01 | End: 2022-01-01

## 2022-01-01 RX ORDER — SERTRALINE HYDROCHLORIDE 50 MG/1
50 TABLET, FILM COATED ORAL DAILY
COMMUNITY
End: 2022-01-01 | Stop reason: SDUPTHER

## 2022-01-01 RX ORDER — CELECOXIB 200 MG/1
200 CAPSULE ORAL DAILY
Qty: 30 CAPSULE | Refills: 0 | Status: SHIPPED | OUTPATIENT
Start: 2022-01-01 | End: 2022-01-01 | Stop reason: SDUPTHER

## 2022-01-01 RX ORDER — DEXAMETHASONE SODIUM PHOSPHATE 4 MG/ML
8 INJECTION, SOLUTION INTRA-ARTICULAR; INTRALESIONAL; INTRAMUSCULAR; INTRAVENOUS; SOFT TISSUE ONCE
Status: COMPLETED | OUTPATIENT
Start: 2022-01-01 | End: 2022-01-01

## 2022-01-01 RX ORDER — SOTALOL HYDROCHLORIDE 80 MG/1
1 TABLET ORAL 2 TIMES DAILY
COMMUNITY
Start: 2022-01-01

## 2022-01-01 RX ORDER — PREDNISONE 20 MG/1
20 TABLET ORAL 2 TIMES DAILY
Qty: 10 TABLET | Refills: 0 | Status: SHIPPED | OUTPATIENT
Start: 2022-01-01 | End: 2022-01-01

## 2022-01-01 RX ORDER — SERTRALINE HYDROCHLORIDE 50 MG/1
50 TABLET, FILM COATED ORAL DAILY
Qty: 30 TABLET | Refills: 0 | Status: SHIPPED | OUTPATIENT
Start: 2022-01-01 | End: 2022-01-01

## 2022-01-01 RX ORDER — OMEPRAZOLE 20 MG/1
20 CAPSULE, DELAYED RELEASE ORAL DAILY
Qty: 30 CAPSULE | Refills: 11 | OUTPATIENT
Start: 2022-01-01

## 2022-01-01 RX ORDER — HYDROCHLOROTHIAZIDE 25 MG/1
25 TABLET ORAL DAILY
Qty: 30 TABLET | Refills: 0 | Status: SHIPPED | OUTPATIENT
Start: 2022-01-01 | End: 2022-01-01 | Stop reason: SDUPTHER

## 2022-01-01 RX ORDER — NAPROXEN 500 MG/1
500 TABLET ORAL 2 TIMES DAILY WITH MEALS
Qty: 60 TABLET | Refills: 0 | Status: SHIPPED | OUTPATIENT
Start: 2022-01-01 | End: 2022-01-01

## 2022-01-01 RX ORDER — OXYCODONE HCL 10 MG/1
10 TABLET, FILM COATED, EXTENDED RELEASE ORAL EVERY 12 HOURS PRN
COMMUNITY
End: 2022-01-01 | Stop reason: SDUPTHER

## 2022-01-01 RX ORDER — HYDROCODONE BITARTRATE AND ACETAMINOPHEN 7.5; 325 MG/15ML; MG/15ML
5 SOLUTION ORAL EVERY 8 HOURS PRN
Qty: 30 ML | Refills: 0 | Status: SHIPPED | OUTPATIENT
Start: 2022-01-01 | End: 2022-01-01

## 2022-01-01 RX ORDER — HYDROCHLOROTHIAZIDE 25 MG/1
25 TABLET ORAL DAILY
Qty: 30 TABLET | Refills: 11 | Status: SHIPPED | OUTPATIENT
Start: 2022-01-01 | End: 2022-01-01

## 2022-01-01 RX ORDER — DEXAMETHASONE SODIUM PHOSPHATE 4 MG/ML
8 INJECTION, SOLUTION INTRA-ARTICULAR; INTRALESIONAL; INTRAMUSCULAR; INTRAVENOUS; SOFT TISSUE ONCE
Status: SHIPPED | OUTPATIENT
Start: 2022-01-01

## 2022-01-01 RX ORDER — PROMETHAZINE HYDROCHLORIDE AND DEXTROMETHORPHAN HYDROBROMIDE 6.25; 15 MG/5ML; MG/5ML
5 SYRUP ORAL EVERY 6 HOURS PRN
Qty: 240 ML | Refills: 1 | Status: SHIPPED | OUTPATIENT
Start: 2022-01-01 | End: 2022-01-01 | Stop reason: SDUPTHER

## 2022-01-01 RX ORDER — AZITHROMYCIN 250 MG/1
TABLET, FILM COATED ORAL
Qty: 6 TABLET | Refills: 0 | Status: SHIPPED | OUTPATIENT
Start: 2022-01-01 | End: 2022-01-01

## 2022-01-01 RX ORDER — OXYCODONE HCL 10 MG/1
10 TABLET, FILM COATED, EXTENDED RELEASE ORAL EVERY 12 HOURS PRN
Qty: 10 TABLET | Refills: 0 | Status: SHIPPED | OUTPATIENT
Start: 2022-01-01 | End: 2022-01-01

## 2022-01-01 RX ORDER — REGADENOSON 0.08 MG/ML
0.4 INJECTION, SOLUTION INTRAVENOUS ONCE
Status: COMPLETED | OUTPATIENT
Start: 2022-01-01 | End: 2022-01-01

## 2022-01-01 RX ORDER — OXYCODONE AND ACETAMINOPHEN 5; 325 MG/1; MG/1
1 TABLET ORAL
Status: COMPLETED | OUTPATIENT
Start: 2022-01-01 | End: 2022-01-01

## 2022-01-01 RX ORDER — SOTALOL HYDROCHLORIDE 80 MG/1
80 TABLET ORAL 2 TIMES DAILY
COMMUNITY
Start: 2022-01-01 | End: 2022-01-01 | Stop reason: SDUPTHER

## 2022-01-01 RX ORDER — HYDROCODONE BITARTRATE AND ACETAMINOPHEN 10; 325 MG/1; MG/1
1 TABLET ORAL EVERY 6 HOURS PRN
Qty: 28 TABLET | Refills: 0 | Status: SHIPPED | OUTPATIENT
Start: 2022-01-01 | End: 2022-01-01

## 2022-01-01 RX ORDER — DEXAMETHASONE SODIUM PHOSPHATE 10 MG/ML
8 INJECTION INTRAMUSCULAR; INTRAVENOUS
Status: COMPLETED | OUTPATIENT
Start: 2022-01-01 | End: 2022-01-01

## 2022-01-01 RX ORDER — NAPROXEN 500 MG/1
500 TABLET ORAL 2 TIMES DAILY WITH MEALS
COMMUNITY
End: 2022-01-01 | Stop reason: SDUPTHER

## 2022-01-01 RX ORDER — HYDROCODONE BITARTRATE AND ACETAMINOPHEN 10; 325 MG/1; MG/1
1 TABLET ORAL EVERY 6 HOURS PRN
Qty: 28 TABLET | Refills: 0 | Status: SHIPPED | OUTPATIENT
Start: 2022-01-01 | End: 2022-01-01 | Stop reason: SDUPTHER

## 2022-01-01 RX ORDER — OMEPRAZOLE 20 MG/1
20 CAPSULE, DELAYED RELEASE ORAL DAILY
COMMUNITY
End: 2022-01-01 | Stop reason: SDUPTHER

## 2022-01-01 RX ORDER — ACETAMINOPHEN 500 MG
1000 TABLET ORAL
Status: COMPLETED | OUTPATIENT
Start: 2022-01-01 | End: 2022-01-01

## 2022-01-01 RX ORDER — TRAZODONE HYDROCHLORIDE 100 MG/1
100 TABLET ORAL NIGHTLY
COMMUNITY
Start: 2022-01-01 | End: 2022-01-01

## 2022-01-01 RX ORDER — SERTRALINE HYDROCHLORIDE 100 MG/1
100 TABLET, FILM COATED ORAL DAILY
Qty: 90 TABLET | Refills: 11 | Status: SHIPPED | OUTPATIENT
Start: 2022-01-01 | End: 2022-01-01

## 2022-01-01 RX ORDER — MULTIVITAMIN
1 TABLET ORAL DAILY
COMMUNITY

## 2022-01-01 RX ORDER — ALBUTEROL SULFATE 90 UG/1
2 AEROSOL, METERED RESPIRATORY (INHALATION) EVERY 6 HOURS PRN
Qty: 18 G | Refills: 11 | Status: SHIPPED | OUTPATIENT
Start: 2022-01-01 | End: 2022-01-01 | Stop reason: SDUPTHER

## 2022-01-01 RX ORDER — FERROUS SULFATE 325(65) MG
325 TABLET, DELAYED RELEASE (ENTERIC COATED) ORAL
Qty: 30 TABLET | Refills: 11 | Status: SHIPPED | OUTPATIENT
Start: 2022-01-01

## 2022-01-01 RX ORDER — CELECOXIB 200 MG/1
200 CAPSULE ORAL
COMMUNITY
End: 2022-01-01 | Stop reason: SDUPTHER

## 2022-01-01 RX ORDER — HYDROCODONE BITARTRATE AND ACETAMINOPHEN 10; 325 MG/1; MG/1
1 TABLET ORAL EVERY 6 HOURS PRN
COMMUNITY
End: 2022-01-01 | Stop reason: SDUPTHER

## 2022-01-01 RX ORDER — CELECOXIB 200 MG/1
200 CAPSULE ORAL DAILY
Qty: 30 CAPSULE | Refills: 1 | Status: SHIPPED | OUTPATIENT
Start: 2022-01-01 | End: 2022-01-01

## 2022-01-01 RX ORDER — SERTRALINE HYDROCHLORIDE 100 MG/1
100 TABLET, FILM COATED ORAL DAILY
COMMUNITY
Start: 2022-01-01 | End: 2022-01-01 | Stop reason: SDUPTHER

## 2022-01-01 RX ADMIN — SODIUM CHLORIDE 1000 ML: 0.9 INJECTION, SOLUTION INTRAVENOUS at 11:07

## 2022-01-01 RX ADMIN — IPRATROPIUM BROMIDE AND ALBUTEROL SULFATE 3 ML: 2.5; .5 SOLUTION RESPIRATORY (INHALATION) at 08:06

## 2022-01-01 RX ADMIN — OXYCODONE AND ACETAMINOPHEN 1 TABLET: 5; 325 TABLET ORAL at 09:06

## 2022-01-01 RX ADMIN — ACETAMINOPHEN 1000 MG: 500 TABLET ORAL at 12:07

## 2022-01-01 RX ADMIN — DEXAMETHASONE SODIUM PHOSPHATE 8 MG: 10 INJECTION INTRAMUSCULAR; INTRAVENOUS at 09:06

## 2022-01-01 RX ADMIN — REGADENOSON 0.4 MG: 0.08 INJECTION, SOLUTION INTRAVENOUS at 08:09

## 2022-01-01 RX ADMIN — CEFTRIAXONE SODIUM 1 G: 1 INJECTION, POWDER, FOR SOLUTION INTRAMUSCULAR; INTRAVENOUS at 11:10

## 2022-01-01 RX ADMIN — DEXAMETHASONE SODIUM PHOSPHATE 8 MG: 4 INJECTION INTRA-ARTICULAR; INTRALESIONAL; INTRAMUSCULAR; INTRAVENOUS; SOFT TISSUE at 10:07

## 2022-01-01 RX ADMIN — DEXAMETHASONE SODIUM PHOSPHATE 8 MG: 4 INJECTION, SOLUTION INTRA-ARTICULAR; INTRALESIONAL; INTRAMUSCULAR; INTRAVENOUS; SOFT TISSUE at 11:10

## 2022-01-01 RX ADMIN — CEFTRIAXONE SODIUM 1 G: 1 INJECTION, POWDER, FOR SOLUTION INTRAMUSCULAR; INTRAVENOUS at 10:07

## 2022-01-01 RX ADMIN — DEXAMETHASONE SODIUM PHOSPHATE 8 MG: 4 INJECTION INTRA-ARTICULAR; INTRALESIONAL; INTRAMUSCULAR; INTRAVENOUS; SOFT TISSUE at 07:08

## 2022-05-26 NOTE — TELEPHONE ENCOUNTER
----- Message from Ilana Mauricio MA sent at 5/26/2022  2:45 PM CDT -----  Contact: IAIN LOWERY [[id number]]  Type: Needs Medical Advice    Who Called: IAIN LOWERY [[id number]]  Best Call Back Number: [phone]  Inquiry/Question: Would you kindly call IAIN LOWERY [[id number]] regarding a sooner appt due to Hospital follow up  Thank you~

## 2022-06-09 NOTE — TELEPHONE ENCOUNTER
----- Message from Oksana Main sent at 6/9/2022 10:56 AM CDT -----  Hospital Follow Up    Who Called: JAVIER MANN     Need To Be Seen Within A Week: YES ASA     Patient Call Back Number: [phone]    Additional Info: PT IS OUT OF ALL MEDICATIONS HE WAS IN THE HOSPITAL FOR PNEUMONIA DISCHARGED 4 WEEKS AGO [facility]

## 2022-06-10 NOTE — ED PROVIDER NOTES
Encounter Date: 6/9/2022       History     Chief Complaint   Patient presents with    Shortness of Breath     Pt reports sob for a couple months worse tonight.Pt reports being hospitalized last month for pneumonia. Pt reports being out of all prescriptions for 2 weeks.      66-year-old male with a history of COPD here from home complaining of shortness of breath the last few months, with worsening tonight.  Patient states he was hospitalized recently for pneumonia.  He states he has been out of his prescriptions for the past 2 weeks.  Denies fever or chills.  No chest pain or palpitation.  No nausea or vomiting.  No abdominal pain.  No dysuria or changes in urinary habits.  Has not followed up with primary care.        Review of patient's allergies indicates:  No Known Allergies  Past Medical History:   Diagnosis Date    Constipation     COPD (chronic obstructive pulmonary disease)     HTN (hypertension)     Mild intermittent asthma, uncomplicated     Other pulmonary embolism without acute cor pulmonale     Rheumatoid arthritis, unspecified      Past Surgical History:   Procedure Laterality Date    HAND TENDON SURGERY       History reviewed. No pertinent family history.  Social History     Tobacco Use    Smoking status: Never Smoker    Smokeless tobacco: Never Used   Substance Use Topics    Alcohol use: Never    Drug use: Never     Review of Systems   Constitutional: Negative.    HENT: Negative.    Eyes: Negative.    Respiratory: Positive for shortness of breath and wheezing. Negative for cough and stridor.    Cardiovascular: Negative for chest pain and palpitations.   Gastrointestinal: Negative.    Endocrine: Negative.    Musculoskeletal: Negative.    Skin: Negative.    Neurological: Negative.        Physical Exam     Initial Vitals [06/09/22 2008]   BP Pulse Resp Temp SpO2   (!) 171/97 72 (!) 24 98.2 °F (36.8 °C) 96 %      MAP       --         Physical Exam    Nursing note and vitals  reviewed.  Constitutional: He appears well-developed and well-nourished. He is not diaphoretic. No distress.   HENT:   Head: Normocephalic and atraumatic.   Nose: Nose normal.   Mouth/Throat: Oropharynx is clear and moist.   Eyes: Conjunctivae and EOM are normal. Pupils are equal, round, and reactive to light.   Neck: Neck supple. No JVD present.   Normal range of motion.  Cardiovascular: Normal rate, regular rhythm, normal heart sounds and intact distal pulses.   No murmur heard.  Pulmonary/Chest: No stridor. No respiratory distress. He has wheezes. He has rhonchi.   Abdominal: Abdomen is soft. Bowel sounds are normal. He exhibits no distension. There is no abdominal tenderness.   Musculoskeletal:         General: No tenderness or edema. Normal range of motion.      Cervical back: Normal range of motion and neck supple.     Neurological: He is alert and oriented to person, place, and time. He has normal strength and normal reflexes. No cranial nerve deficit or sensory deficit. GCS score is 15. GCS eye subscore is 4. GCS verbal subscore is 5. GCS motor subscore is 6.   Skin: Skin is warm and dry. Capillary refill takes less than 2 seconds. No rash noted. No erythema.   Psychiatric: He has a normal mood and affect. His behavior is normal.         ED Course   Procedures  Labs Reviewed   CBC W/ AUTO DIFFERENTIAL - Abnormal; Notable for the following components:       Result Value    RBC 4.35 (*)     Hemoglobin 12.8 (*)     Hematocrit 38.3 (*)     MPV 8.4 (*)     Mono # 1.1 (*)     Eos # 2.0 (*)     Eosinophil % 23.1 (*)     All other components within normal limits   COMPREHENSIVE METABOLIC PANEL - Abnormal; Notable for the following components:    CO2 22 (*)     All other components within normal limits   SARS-COV-2 RNA AMPLIFICATION, QUAL    Narrative:     Is the patient symptomatic?->No   B-TYPE NATRIURETIC PEPTIDE     EKG Readings: (Independently Interpreted)   EKG personally reviewed by me shows sinus rhythm,  first-degree AV block, 60 beats per minute, KENZIE interval 230, . No ST elevations or depressions, no T-wave changes, no arrhythmia.     ECG Results          EKG 12-lead (Final result)  Result time 06/10/22 12:16:28    Final result by Beltran, Lab In [facility] (06/10/22 12:16:28)                 Narrative:    Test Reason : R06.02,    Vent. Rate : 068 BPM     Atrial Rate : 068 BPM     P-R Int : 230 ms          QRS Dur : 088 ms      QT Int : 432 ms       P-R-T Axes : 075 078 066 degrees     QTc Int : 459 ms    Sinus rhythm with 1st degree A-V block  Otherwise normal ECG  No previous ECGs available  Confirmed by Mathew Murillo MD ([id number]) on 6/10/2022 12:16:19 PM    Referred By: ALEX   SELF           Confirmed By:Mathew Murillo MD                            Imaging Results          X-Ray Chest 1 View (Final result)  Result time 06/10/22 07:10:35    Final result by Rony Red MD (06/10/22 07:10:35)                 Impression:      No acute abnormality.      Electronically signed by: Rony Red  Date:    06/10/2022  Time:    07:10             Narrative:    EXAMINATION:  XR CHEST 1 VIEW    CLINICAL HISTORY:  shortness of breath;.    TECHNIQUE:  Single frontal portable view of the chest was performed.    COMPARISON:  None    FINDINGS:  Support devices: None    The lungs are clear, with normal appearance of pulmonary vasculature and no pleural effusion or pneumothorax.    The cardiac silhouette is normal in size. The hilar and mediastinal contours are unremarkable.    Bones are intact.                              X-Rays:   Independently Interpreted Readings:   Other Readings:  Chest x-ray personally reviewed by me shows clear lungs bilaterally.  No lobar consolidations or evidence of pneumonia.  No pneumothorax.  No obvious edema.  Chronic changes consistent with  D. Normal cardiac silhouette    Medications   albuterol-ipratropium 2.5 mg-0.5 mg/3 mL nebulizer solution 3 mL (3 mLs Nebulization Given 6/9/22 2052)    dexAMETHasone sodium phos (PF) injection 8 mg (8 mg Intravenous Given 6/9/22 2145)   oxyCODONE-acetaminophen 5-325 mg per tablet 1 tablet (1 tablet Oral Given 6/9/22 2145)     Medical Decision Making:   Differential Diagnosis:   COPD exacerbation, CHF, pneumonia, pneumothorax, COVID-19, other viral illness, etc.   ED Management:  Labs are normal, chest x-ray does not show any evidence of pneumonia or pneumothorax.  Chronic appearing interstitial markings consistent with COPD.  After being given breathing treatments and 8 mg Decadron, the patient is feeling much better.  Lung sounds are significantly improved.  O2 saturations are good.  I believe patient is safe for discharge home at this point.  Patient states he is out of all of his he will return here as needed or fortunately.  We will refill some of his meds.  He will follow up Dr. Nance as scheduled, and return here as needed or if worse in any way.                      Clinical Impression:   Final diagnoses:  [R06.02] Shortness of breath  [J44.1] COPD exacerbation (Primary)  [Z76.0] Medication refill          ED Disposition Condition    Discharge Stable        ED Prescriptions     Medication Sig Dispense Start Date End Date Auth. Provider    apixaban (ELIQUIS) 5 mg Tab Take 1 tablet (5 mg total) by mouth 2 (two) times daily. 60 tablet 6/9/2022  Michael Smith MD    celecoxib (CELEBREX) 200 MG capsule Take 1 capsule (200 mg total) by mouth once daily. 30 capsule 6/9/2022  Michael Smith MD    hydroCHLOROthiazide (HYDRODIURIL) 25 MG tablet Take 1 tablet (25 mg total) by mouth once daily. 30 tablet 6/9/2022 7/9/2022 Michael Smith MD    naproxen (NAPROSYN) 500 MG tablet Take 1 tablet (500 mg total) by mouth 2 (two) times daily with meals. 60 tablet 6/9/2022  Michael Smith MD    omeprazole (PRILOSEC) 20 MG capsule Take 1 capsule (20 mg total) by mouth once daily. 30 capsule 6/9/2022  Michael Smith MD    oxyCODONE (OXYCONTIN) 10 mg 12 hr tablet Take 1  tablet (10 mg total) by mouth every 12 (twelve) hours as needed for Pain. 10 tablet 6/9/2022  Michael Smith MD    sertraline (ZOLOFT) 50 MG tablet Take 1 tablet (50 mg total) by mouth once daily. 30 tablet 6/9/2022  Michael Smith MD        Follow-up Information     Follow up With Specialties Details Why Contact Info    Rc Nance MD Family Medicine  As scheduled [address] [place] [address]      [facility] Emergency Dept Emergency Medicine  As needed, If symptoms worsen [address] [facility] [address] [phone]           Michael Smith MD  06/09/22 2248       Michael Smith MD  07/03/22 0858

## 2022-06-10 NOTE — DISCHARGE INSTRUCTIONS
Take medications as prescribed and continue your efforts to follow up with Dr. Nance in the near future.  Return here as needed or if worse in any way.

## 2022-07-15 NOTE — ED NOTES
Pt here for SOB.  EMS reports sats in the 80s in the field and gave the patient a duoneb treatment and the patients sats came up.  Pt assisted with urinal.  Sats dropped while urinating.  Pt visibly SOB.  Pt currently on 2L O2, sats rising after getting back in bed.  Continuous cardiac placed.  Will continue to monitor.

## 2022-07-15 NOTE — ED PROVIDER NOTES
Encounter Date: 7/15/2022       History     Chief Complaint   Patient presents with    Shortness of Breath     SOB at home, hx of COPD. Activated EMS when fire arrived pt was 80 percent on room air, pt received duoneb in the field.      66-year-old male presents the ED complaining of shortness of breath cough injected eyes with some discharge bilaterally, reportedly from the field the patient was at 80% on his oxygen saturation at BT is known to have a history of COPD and asthma        Review of patient's allergies indicates:  No Known Allergies  Past Medical History:   Diagnosis Date    Constipation     COPD (chronic obstructive pulmonary disease)     HTN (hypertension)     Mild intermittent asthma, uncomplicated     Other pulmonary embolism without acute cor pulmonale     Rheumatoid arthritis, unspecified      Past Surgical History:   Procedure Laterality Date    HAND TENDON SURGERY       No family history on file.  Social History     Tobacco Use    Smoking status: Former Smoker    Smokeless tobacco: Never Used   Substance Use Topics    Alcohol use: Never    Drug use: Never     Review of Systems   Constitutional: Negative for fever.   HENT: Negative for sore throat.    Eyes: Positive for discharge. Negative for visual disturbance.   Respiratory: Negative for shortness of breath.    Cardiovascular: Negative for chest pain.   Gastrointestinal: Negative for nausea.   Genitourinary: Negative for dysuria.   Musculoskeletal: Negative for back pain.   Skin: Negative for rash.   Neurological: Negative for dizziness and weakness.   Hematological: Does not bruise/bleed easily.       Physical Exam     Initial Vitals [07/15/22 1119]   BP Pulse Resp Temp SpO2   93/84 109 (!) 24 100.3 °F (37.9 °C) (!) 94 %      MAP       --         Physical Exam    Nursing note and vitals reviewed.  Constitutional: He appears well-developed and well-nourished. He is not diaphoretic. No distress.   HENT:   Head: Normocephalic and  atraumatic.   Nose: Nose normal.   Mouth/Throat: Oropharynx is clear and moist. No oropharyngeal exudate.   Eyes: EOM are normal. Left eye exhibits discharge.   Thin watery discharge from both eyes the sclera are injected corneas are clear   Neck: Neck supple. No tracheal deviation present.   Normal range of motion.  Cardiovascular: Normal rate and regular rhythm.   No murmur heard.  Pulmonary/Chest: Breath sounds normal. No stridor. No respiratory distress. He has no rales.   Abdominal: Abdomen is soft. He exhibits no distension and no mass. There is no abdominal tenderness. There is no rebound.   Musculoskeletal:         General: No edema. Normal range of motion.      Cervical back: Normal range of motion and neck supple.     Lymphadenopathy:     He has no cervical adenopathy.   Neurological: He is alert and oriented to person, place, and time. He has normal strength.   Skin: Skin is warm and dry. Capillary refill takes less than 2 seconds. No pallor.   Psychiatric: He has a normal mood and affect.         ED Course   Procedures  Labs Reviewed   CBC W/ AUTO DIFFERENTIAL - Abnormal; Notable for the following components:       Result Value    WBC 18.32 (*)     RBC 4.15 (*)     Hemoglobin 12.3 (*)     Hematocrit 35.3 (*)     MPV 9.0 (*)     Gran # (ANC) 14.2 (*)     Immature Grans (Abs) 0.09 (*)     Mono # 1.3 (*)     Eos # 1.7 (*)     Gran % 77.3 (*)     Lymph % 5.6 (*)     Eosinophil % 9.1 (*)     All other components within normal limits   COMPREHENSIVE METABOLIC PANEL - Abnormal; Notable for the following components:    Sodium 127 (*)     Chloride 91 (*)     Glucose 119 (*)     Albumin 3.2 (*)     AST 49 (*)     ALT 52 (*)     All other components within normal limits   CULTURE, BLOOD    Narrative:     Aerobic and anaerobic   CULTURE, BLOOD    Narrative:     Aerobic and anaerobic   SARS-COV-2 RNA AMPLIFICATION, QUAL    Narrative:     Is the patient symptomatic?->Yes   LACTIC ACID, PLASMA     EKG Readings:  (Independently Interpreted)   Initial Reading: No STEMI. Rhythm: Normal Sinus Rhythm. Heart Rate: 93. Ectopy: No Ectopy. Conduction: Normal. ST Segments: Normal ST Segments. T Waves: Normal.     ECG Results          EKG 12-lead (Final result)  Result time 07/15/22 16:35:53    Final result by Interface, Lab In [facility] (07/15/22 16:35:53)                 Narrative:    Test Reason : R06.02,    Vent. Rate : 093 BPM     Atrial Rate : 093 BPM     P-R Int : 226 ms          QRS Dur : 082 ms      QT Int : 360 ms       P-R-T Axes : 084 085 083 degrees     QTc Int : 447 ms    Sinus rhythm with 1st degree A-V block  Septal infarct ,age undetermined  Abnormal ECG  When compared with ECG of 09-JUN-2022 21:05,  No significant change was found  Confirmed by Mathew Murillo MD ([id number]) on 7/15/2022 4:35:40 PM    Referred By: [email or web address]   SELF           Confirmed By:Mathew Murillo MD                            Imaging Results          X-Ray Chest AP Portable (Final result)  Result time 07/15/22 12:00:21    Final result by Xin Tanner MD (07/15/22 12:00:21)                 Impression:      Interval increase in prominence of interstitial markings may be secondary to viral infection or bronchitis.      Electronically signed by: Xin Tanner  Date:    07/15/2022  Time:    12:00             Narrative:    EXAMINATION:  XR CHEST AP PORTABLE    CLINICAL HISTORY:  Sepsis;    TECHNIQUE:  Single frontal view of the chest was performed.    COMPARISON:  06/09/2022    FINDINGS:  Cardiomediastinal silhouette is within normal limits.  Interstitial changes throughout the lungs have developed.  There is no focal consolidation or pleural effusion.  No acute bony abnormality.                            [facility] Emergency Dept  6 Hours Sepsis Perfusion Exam   Provider Attestation    I attest, a sepsis perfusion exam was performed within 6 hours of Septic Shock presentation, following fluid resuscitation.    12:57 PM07/21/2022  Serafin Mora,  MD         Medications   sodium chloride 0.9% bolus 1,000 mL (0 mLs Intravenous Stopped 7/15/22 1239)   acetaminophen tablet 1,000 mg (1,000 mg Oral Given 7/15/22 1207)                          Clinical Impression:   Final diagnoses:  [R06.02] SOB (shortness of breath)  [B30.9] Viral conjunctivitis of both eyes (Primary)  [J40] Bronchitis          ED Disposition Condition    Discharge Stable        ED Prescriptions     Medication Sig Dispense Start Date End Date Auth. Provider    hydrocodone-acetaminophen (HYCET) solution 7.5-325 mg/15mL Take 5 mLs by mouth every 8 (eight) hours as needed (cough). 30 mL 7/15/2022  Serafin Mora MD        Follow-up Information    None          Serafin Mora MD  07/21/22 4392

## 2022-07-22 NOTE — PROGRESS NOTES
" Patient ID: Benedicto Romo is a 66 y.o. male.    Chief Complaint: Establish Care (Would like colonoscopy & BW)      Reviewed family, medical, surgical, and social history.    No cp/sob  No change in mental status  No fever  No asymmetrical limb swelling    Objective:      /69 (BP Location: Right arm, Patient Position: Sitting, BP Method: Medium (Manual))   Pulse 104   Resp 15   Ht 5' 8" (1.727 m)   Wt 65.1 kg (143 lb 9.6 oz)   SpO2 99%   BMI 21.83 kg/m²   RRR, CTAB, s/nt/nd, no c/c/e, non-toxic appearing, no focal weakness  Assessment:       1. Primary hypertension    2. Colon cancer screening    3. Arthralgia, unspecified joint    4. Cardiac arrhythmia, unspecified cardiac arrhythmia type    5. History of pulmonary embolism    6. Hyperglycemia    7. Bronchitis        Plan:       Primary hypertension  -     Ambulatory referral/consult to General Surgery; Future; Expected date: 07/29/2022  -     Microalbumin/Creatinine Ratio, Urine; Future  -     Lipid Panel; Future; Expected date: 07/22/2022  -     TSH; Future; Expected date: 07/22/2022  -     T4, Free; Future; Expected date: 07/22/2022  -     Hemoglobin A1C; Future; Expected date: 07/22/2022  -     Sedimentation rate; Future; Expected date: 07/22/2022  -     C-Reactive Protein; Future; Expected date: 07/22/2022  -     YONI Screen w/Reflex; Future; Expected date: 07/22/2022  -     Rheumatoid Factor; Future; Expected date: 07/22/2022  -     Uric Acid; Future; Expected date: 07/22/2022  -     CBC Auto Differential; Future; Expected date: 07/22/2022  -     Comprehensive Metabolic Panel; Future; Expected date: 07/22/2022  -     SCHEDULED EKG 12-LEAD (to Muse); Future  -     Echo; Future  -     HYDROcodone-acetaminophen (NORCO)  mg per tablet; Take 1 tablet by mouth every 6 (six) hours as needed for Pain.  Dispense: 28 tablet; Refill: 0    Colon cancer screening  -     Ambulatory referral/consult to General Surgery; Future; Expected date: 07/29/2022  -   "   HYDROcodone-acetaminophen (NORCO)  mg per tablet; Take 1 tablet by mouth every 6 (six) hours as needed for Pain.  Dispense: 28 tablet; Refill: 0    Arthralgia, unspecified joint  -     Microalbumin/Creatinine Ratio, Urine; Future  -     Lipid Panel; Future; Expected date: 07/22/2022  -     TSH; Future; Expected date: 07/22/2022  -     T4, Free; Future; Expected date: 07/22/2022  -     Hemoglobin A1C; Future; Expected date: 07/22/2022  -     Sedimentation rate; Future; Expected date: 07/22/2022  -     C-Reactive Protein; Future; Expected date: 07/22/2022  -     YONI Screen w/Reflex; Future; Expected date: 07/22/2022  -     Rheumatoid Factor; Future; Expected date: 07/22/2022  -     Uric Acid; Future; Expected date: 07/22/2022  -     CBC Auto Differential; Future; Expected date: 07/22/2022  -     Comprehensive Metabolic Panel; Future; Expected date: 07/22/2022  -     HYDROcodone-acetaminophen (NORCO)  mg per tablet; Take 1 tablet by mouth every 6 (six) hours as needed for Pain.  Dispense: 28 tablet; Refill: 0    Cardiac arrhythmia, unspecified cardiac arrhythmia type  -     SCHEDULED EKG 12-LEAD (to Muse); Future  -     Echo; Future  -     HYDROcodone-acetaminophen (NORCO)  mg per tablet; Take 1 tablet by mouth every 6 (six) hours as needed for Pain.  Dispense: 28 tablet; Refill: 0    History of pulmonary embolism  -     HYDROcodone-acetaminophen (NORCO)  mg per tablet; Take 1 tablet by mouth every 6 (six) hours as needed for Pain.  Dispense: 28 tablet; Refill: 0    Hyperglycemia  -     Ambulatory referral/consult to General Surgery; Future; Expected date: 07/29/2022  -     Microalbumin/Creatinine Ratio, Urine; Future  -     Lipid Panel; Future; Expected date: 07/22/2022  -     TSH; Future; Expected date: 07/22/2022  -     T4, Free; Future; Expected date: 07/22/2022  -     Hemoglobin A1C; Future; Expected date: 07/22/2022  -     Sedimentation rate; Future; Expected date: 07/22/2022  -      C-Reactive Protein; Future; Expected date: 07/22/2022  -     YONI Screen w/Reflex; Future; Expected date: 07/22/2022  -     Rheumatoid Factor; Future; Expected date: 07/22/2022  -     Uric Acid; Future; Expected date: 07/22/2022  -     CBC Auto Differential; Future; Expected date: 07/22/2022  -     Comprehensive Metabolic Panel; Future; Expected date: 07/22/2022  -     SCHEDULED EKG 12-LEAD (to Muse); Future  -     Echo; Future  -     HYDROcodone-acetaminophen (NORCO)  mg per tablet; Take 1 tablet by mouth every 6 (six) hours as needed for Pain.  Dispense: 28 tablet; Refill: 0    Bronchitis  -     HYDROcodone-acetaminophen (NORCO)  mg per tablet; Take 1 tablet by mouth every 6 (six) hours as needed for Pain.  Dispense: 28 tablet; Refill: 0  -     dexamethasone injection 8 mg  -     cefTRIAXone injection 1 g  -     predniSONE (DELTASONE) 20 MG tablet; Take 1 tablet (20 mg total) by mouth 2 (two) times daily. for 5 days  Dispense: 10 tablet; Refill: 0  -     promethazine-dextromethorphan (PROMETHAZINE-DM) 6.25-15 mg/5 mL Syrp; Take 5 mLs by mouth every 6 (six) hours as needed (cough).  Dispense: 240 mL; Refill: 1  -     doxycycline (VIBRA-TABS) 100 MG tablet; Take 1 tablet (100 mg total) by mouth 2 (two) times daily.  Dispense: 20 tablet; Refill: 0              Continue current medicines, any changes noted above   reviewed  Week of meds  Extensive investigation into mmp  Labs, radiology studies, and procedures/notes from the last 3 months were reviewed.    Risks, benefits, and side effects were discussed with the patient. All questions were answered to the fullest satisfaction of the patient, and pt verbalized understanding and agreement to treatment plan. Pt was to call with any new or worsening symptoms, or present to the ER.

## 2022-08-02 NOTE — TELEPHONE ENCOUNTER
Called pt to scheduled gen surg referral Ms. Herrera stated they had car trouble and hope to make scheduled Echo. Spoke with Tete in cardio and she stated if the missed scheduled appt they will work them in. I will call later to scheduled referral. SH

## 2022-08-15 PROBLEM — K75.9 HEPATITIS: Status: ACTIVE | Noted: 2022-01-01

## 2022-08-15 PROBLEM — I10 PRIMARY HYPERTENSION: Status: ACTIVE | Noted: 2022-01-01

## 2022-08-15 PROBLEM — Z86.711 HISTORY OF PULMONARY EMBOLISM: Status: ACTIVE | Noted: 2022-01-01

## 2022-08-15 PROBLEM — M06.9 RHEUMATOID ARTHRITIS: Status: ACTIVE | Noted: 2022-01-01

## 2022-08-15 PROBLEM — I49.9 CARDIAC ARRHYTHMIA: Status: ACTIVE | Noted: 2022-01-01

## 2022-08-15 PROBLEM — M25.50 ARTHRALGIA: Status: ACTIVE | Noted: 2022-01-01

## 2022-08-15 NOTE — PROGRESS NOTES
" Patient ID: Benedicto Romo is a 66 y.o. male.    Chief Complaint: Follow-up (Test review) and Medication Refill      Reviewed family, medical, surgical, and social history.    No cp/sob  No change in mental status  No fever  No asymmetrical limb swelling    Objective:      /79 (BP Location: Right arm, Patient Position: Sitting, BP Method: Medium (Manual))   Pulse 72   Resp 15   Ht 5' 8" (1.727 m)   Wt 64.4 kg (142 lb)   SpO2 98%   BMI 21.59 kg/m²   RRR, CTAB, s/nt/nd, no c/c/e, non-toxic appearing, no focal weakness  Assessment:       1. Rheumatoid arteritis    2. Medication refill    3. Bronchitis    4. Colon cancer screening    5. Arthralgia, unspecified joint    6. Cardiac arrhythmia, unspecified cardiac arrhythmia type    7. History of pulmonary embolism    8. Primary hypertension    9. Hyperglycemia    10. Rheumatoid arthritis, involving unspecified site, unspecified whether rheumatoid factor present    11. Hepatitis        Plan:       Rheumatoid arteritis  -     dexamethasone injection 8 mg    Medication refill  -     celecoxib (CELEBREX) 200 MG capsule; Take 1 capsule (200 mg total) by mouth once daily.  Dispense: 30 capsule; Refill: 1  -     hydroCHLOROthiazide (HYDRODIURIL) 25 MG tablet; Take 1 tablet (25 mg total) by mouth once daily.  Dispense: 30 tablet; Refill: 11  -     omeprazole (PRILOSEC) 20 MG capsule; Take 1 capsule (20 mg total) by mouth once daily.  Dispense: 30 capsule; Refill: 11  -     dexamethasone injection 8 mg    Bronchitis  -     promethazine-dextromethorphan (PROMETHAZINE-DM) 6.25-15 mg/5 mL Syrp; Take 5 mLs by mouth every 6 (six) hours as needed (cough).  Dispense: 240 mL; Refill: 1  -     HYDROcodone-acetaminophen (NORCO)  mg per tablet; Take 1 tablet by mouth every 6 (six) hours as needed for Pain.  Dispense: 28 tablet; Refill: 0  -     dexamethasone injection 8 mg    Colon cancer screening  -     HYDROcodone-acetaminophen (NORCO)  mg per tablet; Take 1 tablet " by mouth every 6 (six) hours as needed for Pain.  Dispense: 28 tablet; Refill: 0  -     dexamethasone injection 8 mg    Arthralgia, unspecified joint  -     HYDROcodone-acetaminophen (NORCO)  mg per tablet; Take 1 tablet by mouth every 6 (six) hours as needed for Pain.  Dispense: 28 tablet; Refill: 0  -     dexamethasone injection 8 mg    Cardiac arrhythmia, unspecified cardiac arrhythmia type  -     HYDROcodone-acetaminophen (NORCO)  mg per tablet; Take 1 tablet by mouth every 6 (six) hours as needed for Pain.  Dispense: 28 tablet; Refill: 0  -     Ambulatory referral/consult to Cardiology; Future; Expected date: 08/22/2022  -     dexamethasone injection 8 mg    History of pulmonary embolism  -     HYDROcodone-acetaminophen (NORCO)  mg per tablet; Take 1 tablet by mouth every 6 (six) hours as needed for Pain.  Dispense: 28 tablet; Refill: 0  -     dexamethasone injection 8 mg    Primary hypertension  -     HYDROcodone-acetaminophen (NORCO)  mg per tablet; Take 1 tablet by mouth every 6 (six) hours as needed for Pain.  Dispense: 28 tablet; Refill: 0  -     dexamethasone injection 8 mg    Hyperglycemia  -     HYDROcodone-acetaminophen (NORCO)  mg per tablet; Take 1 tablet by mouth every 6 (six) hours as needed for Pain.  Dispense: 28 tablet; Refill: 0  -     dexamethasone injection 8 mg    Rheumatoid arthritis, involving unspecified site, unspecified whether rheumatoid factor present  -     Ambulatory referral/consult to Rheumatology; Future; Expected date: 08/22/2022  -     dexamethasone injection 8 mg    Hepatitis  -     Hepatitis B Viral DNA by PCR, Qualitative; Future; Expected date: 08/15/2022  -     Hepatitis C RNA, Quantitative, PCR; Future; Expected date: 08/15/2022  -     dexamethasone injection 8 mg    Other orders  -     albuterol (PROVENTIL/VENTOLIN HFA) 90 mcg/actuation inhaler; Inhale 2 puffs into the lungs every 6 (six) hours as needed for Wheezing.  Dispense: 18 g;  Refill: 11  -     apixaban (ELIQUIS) 5 mg Tab; Take 1 tablet (5 mg total) by mouth 2 (two) times a day.  Dispense: 60 tablet; Refill: 11  -     sertraline (ZOLOFT) 100 MG tablet; Take 1 tablet (100 mg total) by mouth once daily.  Dispense: 30 tablet; Refill: 11  -     sotaloL (BETAPACE) 80 MG tablet; Take 1 tablet (80 mg total) by mouth 2 (two) times a day.  Dispense: 60 tablet; Refill: 11              Continue current medicines, any changes noted above  Referrals as shown, check labs, refill pain medicine,  reviewed  Labs, radiology studies, and procedures/notes from the last 3 months were reviewed.    Risks, benefits, and side effects were discussed with the patient. All questions were answered to the fullest satisfaction of the patient, and pt verbalized understanding and agreement to treatment plan. Pt was to call with any new or worsening symptoms, or present to the ER.

## 2022-08-17 NOTE — TELEPHONE ENCOUNTER
LVM that appointment from 8/25/22 had to be r/s to 9/1/22 @8:00. Dr. Murillo will be out of office. If new date/time not workable please contact our office at [phone].

## 2022-09-01 PROBLEM — J42 CHRONIC BRONCHITIS: Status: ACTIVE | Noted: 2022-01-01

## 2022-09-01 PROBLEM — F11.20 UNCOMPLICATED OPIOID DEPENDENCE: Status: ACTIVE | Noted: 2022-01-01

## 2022-09-01 PROBLEM — I34.0 NONRHEUMATIC MITRAL VALVE REGURGITATION: Status: ACTIVE | Noted: 2022-01-01

## 2022-09-01 PROBLEM — R63.4 WEIGHT LOSS, UNINTENTIONAL: Status: ACTIVE | Noted: 2022-01-01

## 2022-09-01 PROBLEM — R06.02 SOB (SHORTNESS OF BREATH): Status: ACTIVE | Noted: 2022-01-01

## 2022-09-01 PROBLEM — Z82.49 FAMILY HISTORY OF PREMATURE CAD: Status: ACTIVE | Noted: 2022-01-01

## 2022-09-01 PROBLEM — Z91.89 AT RISK FOR CARDIOVASCULAR EVENT: Status: ACTIVE | Noted: 2022-01-01

## 2022-09-01 PROBLEM — R29.6 MULTIPLE FALLS: Status: ACTIVE | Noted: 2022-01-01

## 2022-09-01 PROBLEM — Z87.891 HISTORY OF SMOKING GREATER THAN 50 PACK YEARS: Status: ACTIVE | Noted: 2022-01-01

## 2022-09-01 PROBLEM — G47.19 EXCESSIVE DAYTIME SLEEPINESS: Status: ACTIVE | Noted: 2022-01-01

## 2022-09-01 PROBLEM — Z79.01 LONG TERM (CURRENT) USE OF ANTICOAGULANTS: Status: ACTIVE | Noted: 2022-01-01

## 2022-09-01 PROBLEM — Z91.89 SEDENTARY LIFESTYLE: Status: ACTIVE | Noted: 2022-01-01

## 2022-09-01 PROBLEM — R94.31 NONSPECIFIC ABNORMAL ELECTROCARDIOGRAM (ECG) (EKG): Status: ACTIVE | Noted: 2022-01-01

## 2022-09-01 PROBLEM — U07.1 COVID-19: Status: ACTIVE | Noted: 2022-01-01

## 2022-09-01 PROBLEM — E78.5 DYSLIPIDEMIA: Status: ACTIVE | Noted: 2022-01-01

## 2022-09-01 PROBLEM — D64.9 ANEMIA: Status: ACTIVE | Noted: 2022-01-01

## 2022-09-01 NOTE — PATIENT INSTRUCTIONS
Recommended Mediterranean dietEating Heart-Healthy Food: Using the DASH Plan  Eating for your heart doesnt have to be hard or boring. You just need to know how to make healthier choices. The DASH eating plan has been developed to help you do just that. DASH stands for Dietary Approaches to Stop Hypertension. It is a plan that has been proven to be healthier for your heart and to lower your risk for high blood pressure. It can also help lower your risk for cancer, heart disease, osteoporosis, and diabetes.  Choosing from Each Food Group  Choose foods from each of the food groups below each day. Try to get the recommended number of servings for each food group. The serving numbers are based on a diet of 2,000 calories a day. Talk to your doctor if youre unsure about your calorie needs.  Grains   Servings: 7-8 a day  A serving is:  1 slice bread  1 ounce dry cereal  half a cup cooked rice or pasta  Best choices: Whole grains and any grains high in fiber.  Vegetables   Servings: 4-5 a day  A serving is:  1 cup raw leafy vegetable  Half a cup cooked vegetable  Three-quarter cup vegetable juice  Best choices: Fresh or frozen vegetable prepared without too much added salt or fat.    Fruits   Servings: 4-5 a day  A serving is:  Three-quarter cup fruit juice  1 medium fruit  One-quarter cup dried fruit  One-half cup fresh, frozen, or canned fruit  Best choices: A variety of fresh fruits of different colors. Whole fruits are a much better choice than fruit juices.  Low-fat or Fat Free Dairy   Servings: 2-3 a day  A serving is:  8 ounces milk  1 cup yogurt  One and a half ounces cheese  Best choices: Skim or 1% milk, low-fat or fat free yogurt or buttermilk, and low-fat cheeses.       Meat, Poultry, Fish   Servings: 2 or fewer a day  A serving is:  3 ounces cooked meat, poultry, or fish  Best choices: Lean meats and fish. Trim away visible fat. Broil, roast, or boil instead of frying. Remove skin from poultry before eating.   Nuts, Seeds, Beans   Servings: 4-5 a week  A serving is:  One third cup nuts (or one and a half ounces)  2 tablespoons sunflower seeds  Half a cup cooked beans  Best choices: Dry roasted nuts with no salt added, lentils, kidney beans, garbanzo beans, and whole tenorio beans.    Fats and Oils   Servings: 2 a day  A serving is:  1 teaspoon vegetable oil  1 teaspoon soft margarine  1 tablespoon low-fat mayonnaise  1 teaspoon regular mayonnaise  2 tablespoons light salad dressing  1 tablespoon regular salad dressing  Best choices: Monounsaturated and polyunsaturated fats such as olive, canola, or safflower oil.  Sweets   Servings: 5 a week or fewer  A serving is:  1 tablespoon sugar, maple syrup, or honey  1 tablespoon jam or jelly  1 half-ounce jelly beans (about 15)  8 ounces lemonade  Best choices: Dried fruit can be a satisfying sweet. Choose low-fat sweets when possible. And watch your serving sizes!       Aerobic Exercise for a Healthy Heart  Exercise is a lot more than an energy booster and a stress reliever. It also strengthens your heart muscle, lowers your blood pressure and blood cholesterol, and burns calories.      Remember, some activity is better than none.     Choose an Aerobic Activity  Choose a nonstop activity that makes your heart and lungs work harder than they do when you rest or walk normally. This aerobic exercise can improve the way your heart and other muscles use oxygen. Make it fun by exercising with a friend and choosing an activity you enjoy. Here are some ideas:  Walking  Swimming  Bicycling  Stair climbing  Dancing  Jogging  Exercise Regularly  If you havent been exercising regularly,  get your doctors okay first. Then start slowly.  Here are some tips:  Begin exercising 3 times a week for 5-10 minutes at a time.  When you feel comfortable, add a few minutes each week.  Slowly build up to exercising 3-4 times each week for 20-40 minutes. Aim for a total of 150 or more minutes a  week.  Be sure to carry your nitroglycerin with you when you exercise.  If you get angina when youre exercising, stop what youre doing, take your nitroglycerin, and call your doctor.  © 2149-3625 [employer], [address] [place] [address], [place] [address]. All rights reserved. This information is not intended as a substitute for professional medical care. Always follow your healthcare professional's instructions.

## 2022-09-01 NOTE — PROGRESS NOTES
Subjective:    Patient ID:  Benedicto Romo is a 66 y.o. male who presents for evaluation of Irregular Heart Beat and Follow-up  PCP and referred by Rc Nance MD  No prior cardiologist nor pulmonary doctor  Lives with sister, Sharon, here with patient, non-smoker   Disabled due RA, 2017, stop [employer] work in 2021    Patient is a new patient to me.     Health literacy: medium   Vaccinations: up-to-date, completed COVID, infection 8/2021 complicated by PE and increase SOB on DOAC  Activities: mostly sedentary since 7/2021 due to RA  Nicotine: quit 2014, 75+ py  Alcohol: heavy in the past, quit 1998  Illicit drugs: none, on Rx Norco bid since 7/2022  Cardiac symptoms: intermittent atypical CP lifelong, progressive SOB  Home BP: 156/50  Medication compliance: yes, sister sets up  Diet: regular, no salt  Caffeine: 4 cpd, no sleep problem  Labs:   Lab Results   Component Value Date    TSH 2.514 07/22/2022        Lab Results   Component Value Date    HGBA1C 5.5 07/22/2022       Lab Results   Component Value Date    WBC 10.89 07/22/2022    HGB 13.3 (L) 07/22/2022    HCT 38.7 (L) 07/22/2022    MCV 88 07/22/2022     07/22/2022       CMP  Sodium   Date Value Ref Range Status   07/22/2022 135 (L) 136 - 145 mmol/L Final     Potassium   Date Value Ref Range Status   07/22/2022 3.8 3.5 - 5.1 mmol/L Final     Chloride   Date Value Ref Range Status   07/22/2022 95 95 - 110 mmol/L Final     CO2   Date Value Ref Range Status   07/22/2022 22 (L) 23 - 29 mmol/L Final     Glucose   Date Value Ref Range Status   07/22/2022 82 70 - 110 mg/dL Final     BUN   Date Value Ref Range Status   07/22/2022 6 (L) 8 - 23 mg/dL Final     Creatinine   Date Value Ref Range Status   07/22/2022 0.8 0.5 - 1.4 mg/dL Final     Calcium   Date Value Ref Range Status   07/22/2022 9.7 8.7 - 10.5 mg/dL Final     Total Protein   Date Value Ref Range Status   07/22/2022 8.9 (H) 6.0 - 8.4 g/dL Final     Albumin   Date Value Ref Range Status    07/22/2022 3.6 3.5 - 5.2 g/dL Final     Total Bilirubin   Date Value Ref Range Status   07/22/2022 0.3 0.1 - 1.0 mg/dL Final     Comment:     For infants and newborns, interpretation of results should be based  on gestational age, weight and in agreement with clinical  observations.    Premature Infant recommended reference ranges:  Up to 24 hours.............<8.0 mg/dL  Up to 48 hours............<12.0 mg/dL  3-5 days..................<15.0 mg/dL  6-29 days.................<15.0 mg/dL       Alkaline Phosphatase   Date Value Ref Range Status   07/22/2022 99 55 - 135 U/L Final     AST   Date Value Ref Range Status   07/22/2022 39 10 - 40 U/L Final     ALT   Date Value Ref Range Status   07/22/2022 37 10 - 44 U/L Final     Anion Gap   Date Value Ref Range Status   07/22/2022 18 (H) 8 - 16 mmol/L Final     eGFR if    Date Value Ref Range Status   07/22/2022 >60.0 >60 mL/min/1.73 m^2 Final     eGFR if non    Date Value Ref Range Status   07/22/2022 >60.0 >60 mL/min/1.73 m^2 Final     Comment:     Calculation used to obtain the estimated glomerular filtration  rate (eGFR) is the CKD-EPI equation.        @labrcntip(troponini)@    BNP   Date Value Ref Range Status   06/09/2022 82 0 - 99 pg/mL Final     Comment:     Values of less than 100 pg/ml are consistent with non-CHF populations.   }   Lab Results   Component Value Date    CHOL 169 07/22/2022     Lab Results   Component Value Date    HDL 41 07/22/2022     Lab Results   Component Value Date    LDLCALC 113.0 07/22/2022     Lab Results   Component Value Date    TRIG 75 07/22/2022     Lab Results   Component Value Date    CHOLHDL 24.3 07/22/2022       Last Echo: 8/2022  Last stress test: none  Cardiovascular angiogram: none  ECG: NSR, rate 80, 1st degree AVB, low anterior forces.  Fundoscopic exam: within the past year, negative for retinopathy    WM referred for cardiac arrhythmia but denies knowledge of it and no clear symptoms. Limited  "mostly due to SOB, progressive since PE with COVID in 8/2021. Heavy smoker in the past. Past ECG reviewed in June and July 2022, both in NSR. Given Sotalol in mid 8/2022, have not fill Rx. Does have very high ASCVD 10-year event risk of > 23%, not on statin and also with premature family history for CAD with mother dying of MI at age 40.    Rc Nance MD noted "Primary hypertension  Cardiac arrhythmia, unspecified cardiac arrhythmia type  -     HYDROcodone-acetaminophen (NORCO)  mg per tablet; Take 1 tablet by mouth every 6 (six) hours as needed for Pain.  Dispense: 28 tablet; Refill: 0  -     Ambulatory referral/consult to Cardiology; Future; Expected date: 08/22/2022  -     dexamethasone injection 8 mg     History of pulmonary embolism"    Echo 8/2022 - The left ventricle is normal in size with concentric remodeling and normal systolic function.  The estimated ejection fraction is 56%.  Normal left ventricular diastolic function.  Normal right ventricular size.  Mild-to-moderate mitral regurgitation.  Normal central venous pressure (3 mmHg).  Mild left atrial enlargement.    CXR - Cardiomediastinal silhouette is within normal limits.    Review of Systems   Constitutional: Positive for decreased appetite and weight loss (10 lbs over 3 months, unintentional). Negative for diaphoresis, fever, malaise/fatigue and night sweats.   HENT:  Negative for nosebleeds and tinnitus.    Eyes:  Positive for double vision. Negative for visual disturbance.   Cardiovascular:  Positive for chest pain, cyanosis, dyspnea on exertion and paroxysmal nocturnal dyspnea. Negative for claudication, irregular heartbeat, leg swelling, near-syncope, orthopnea and palpitations.   Respiratory:  Positive for cough and shortness of breath. Negative for sleep disturbances due to breathing, snoring and wheezing.         [place] score 23, awaken tired.   Endocrine: Positive for heat intolerance, polydipsia and polyuria. " "  Hematologic/Lymphatic: Does not bruise/bleed easily.   Skin:  Negative for color change, flushing, nail changes, poor wound healing and suspicious lesions.   Musculoskeletal:  Positive for arthritis, back pain, falls, joint pain, joint swelling, neck pain and stiffness. Negative for gout, muscle cramps, muscle weakness and myalgias.   Gastrointestinal:  Negative for heartburn, hematemesis, hematochezia, melena and nausea.   Neurological:  Positive for light-headedness, loss of balance and numbness. Negative for disturbances in coordination, excessive daytime sleepiness, dizziness, focal weakness, headaches, vertigo and weakness.   Psychiatric/Behavioral:  Negative for depression and substance abuse. The patient does not have insomnia and is not nervous/anxious.       Objective:    Physical Exam  Constitutional:       Appearance: He is well-developed.      Comments: RA O2 sat 99%   HENT:      Head: Normocephalic.   Eyes:      Conjunctiva/sclera: Conjunctivae normal.      Pupils: Pupils are equal, round, and reactive to light.   Neck:      Thyroid: No thyromegaly.      Vascular: No JVD.      Comments: Circumference 13.5"  Cardiovascular:      Rate and Rhythm: Normal rate and regular rhythm.      Pulses: Intact distal pulses.           Carotid pulses are 1+ on the right side and 1+ on the left side.       Radial pulses are 1+ on the right side and 1+ on the left side.        Dorsalis pedis pulses are 1+ on the right side and 1+ on the left side.        Posterior tibial pulses are 1+ on the right side and 1+ on the left side.      Heart sounds: Heart sounds are distant. No murmur heard.    No friction rub. No gallop.   Pulmonary:      Effort: Pulmonary effort is normal.      Breath sounds: No rales.      Comments: Diminished breath sounds and prolong expiration.  Chest:      Chest wall: No tenderness.   Abdominal:      General: Bowel sounds are normal.      Palpations: Abdomen is soft.      Tenderness: There is no " "abdominal tenderness.      Comments: Waist 34.5"   Musculoskeletal:         General: Normal range of motion.      Cervical back: Normal range of motion and neck supple.   Lymphadenopathy:      Cervical: No cervical adenopathy.   Skin:     General: Skin is warm and dry.      Findings: No rash.   Neurological:      Mental Status: He is alert and oriented to person, place, and time.         Assessment:       1. Cardiac arrhythmia, unspecified cardiac arrhythmia type    2. Nonrheumatic mitral valve regurgitation    3. SOB (shortness of breath), onset 2000    4. History of pulmonary embolism    5. COVID-19, 8/2021, vaccinated, residual increase SOB    6. Rheumatoid arthritis involving multiple sites with positive rheumatoid factor    7. Sedentary lifestyle, onset 7/2021    8. Multiple falls    9. History of smoking greater than 50 pack years, quit 2014, 75+ py    10. Uncomplicated opioid dependence, on Norco bid since 7/2022    11. Anemia, unspecified type    12. At risk for cardiovascular event, ASCVD 10-years risk 23.4%, 2022    13. Dyslipidemia, baseline LDL-C 113    14. Family history of premature CAD    15. Nonspecific abnormal electrocardiogram (ECG) (EKG)    16. Excessive daytime sleepiness    17. Weight loss, unintentional    18. Chronic bronchitis, unspecified chronic bronchitis type    19. Long term (current) use of anticoagulants         Plan:       Benedicto was seen today for irregular heart beat and follow-up.    Diagnoses and all orders for this visit:    Cardiac arrhythmia, unspecified cardiac arrhythmia type  -     Ambulatory referral/consult to Cardiology  -     IN OFFICE EKG 12-LEAD (to Muse)  -     Holter monitor - 48 hour; Future    Nonrheumatic mitral valve regurgitation    SOB (shortness of breath), onset 2000  -     Ambulatory referral/consult to Pulmonology; Future  -     Nuclear Stress - Cardiology Interpreted; Future    History of pulmonary embolism    COVID-19, 8/2021, vaccinated, residual " "increase SOB    Rheumatoid arthritis involving multiple sites with positive rheumatoid factor    Sedentary lifestyle, onset 7/2021    Multiple falls    History of smoking greater than 50 pack years, quit 2014, 75+ py  -     CT Chest Lung Screening Low Dose; Future    Uncomplicated opioid dependence, on Norco bid since 7/2022    Anemia, unspecified type  -     Reticulocytes; Future  -     Ferritin; Future  -     Iron and TIBC; Future    At risk for cardiovascular event, ASCVD 10-years risk 23.4%, 2022  -     Nuclear Stress - Cardiology Interpreted; Future    Dyslipidemia, baseline LDL-C 113    Family history of premature CAD    Nonspecific abnormal electrocardiogram (ECG) (EKG)  -     Nuclear Stress - Cardiology Interpreted; Future    Excessive daytime sleepiness  -     Ambulatory referral/consult to Pulmonology; Future    Weight loss, unintentional  -     CT Chest Lung Screening Low Dose; Future    Chronic bronchitis, unspecified chronic bronchitis type  -     CT Chest Lung Screening Low Dose; Future  -     Ambulatory referral/consult to Pulmonology; Future    Long term (current) use of anticoagulants     - All medical issues reviewed, continue current Rx.  - Warning signs of MI and stroke given, if symptoms last more than 5 minutes, stop immediately and call 911, then chew 2-4 low-dose ASA (81 mg).   - Have a number of potential severed life-threatening comorbidities, all review along with medication regiment and discussed future plans.  - Refer to "Chronic Pain" article in Consumer Report 6/2019 issue.   - Avoid use of NSAID if possible, Tylenol is safer. Can use up to 3000 mg per day, use early with onset of even mild pains.    - Info on [facility]  - CV status and all medications reviewed, patient acknowledge good understanding.  - Recommend healthy living:  healthy diet and regular exercise aiming for fitness, restorative sleep and weight control  - Need good exercise program, 4 key elements: 1. Aerobic (walking, " swimming, dancing, jogging, biking, etc, 2. Muscle strengthening / resistance exercise, need to do 2-3 times weekly, 3. Stretching daily, good stretch takes a whole  total minute. 4. Balance exercise daily.   - Encourage activities as much as tolerated. Any activity is better than none!   - Instruction for Mediterranean, high potassium diet and heart healthy exercise given.  - Check home blood pressure, 2 days weekly, do 2 readings within 5 minutes in AM and PM, keep log for review. Target resting BP is less than 130/85.   - Highly recommend 30-60 minutes of exercise / activities daily, can have Sunday off, with 2-3 sessions of muscle strengthening weekly. A  would be very helpful.  - Recommend at least biannual cardiovascular evaluation in view of patient's significant risk factors.  - Phone review / encourage use of [facility], no [facility]  - The tech support at [facility] is available 5 days a week, from 9 to 5, at [phone].    - Will send note to to referring provider for review.     Greater than 50% of the time was spent in counseling and coordination of care. The above assessment and plan have been discussed at length. Referring provider's note reviewed. Labs and procedure over the last 6 months reviewed. Problem List updated. Asked to bring in all active medications / pills bottles with next visit.

## 2022-09-15 PROBLEM — I73.9 CLAUDICATION: Status: ACTIVE | Noted: 2022-01-01

## 2022-09-15 NOTE — PROGRESS NOTES
" Patient ID: Benedicto Romo is a 66 y.o. male.    Chief Complaint: Follow-up and Fall      Reviewed family, medical, surgical, and social history.    No cp/sob  No change in mental status  No fever  No asymmetrical limb swelling    Objective:      /72 (BP Location: Left arm, Patient Position: Sitting, BP Method: Medium (Manual))   Pulse 81   Resp 15   Ht 5' 8" (1.727 m)   Wt 64.2 kg (141 lb 8 oz)   SpO2 (!) 94%   BMI 21.52 kg/m²   RRR, CTAB, s/nt/nd, no c/c/e, non-toxic appearing, no focal weakness  Assessment:       1. Claudication    2. Cardiac arrhythmia, unspecified cardiac arrhythmia type    3. Primary hypertension    4. Arthralgia, unspecified joint        Plan:       Claudication  -     US Lower Extremity Arteries Bilateral; Future; Expected date: 09/15/2022    Cardiac arrhythmia, unspecified cardiac arrhythmia type    Primary hypertension    Arthralgia, unspecified joint  -     HYDROcodone-acetaminophen (NORCO)  mg per tablet; Take 1 tablet by mouth every 6 (six) hours as needed for Pain.  Dispense: 28 tablet; Refill: 0            Continue current medicines, any changes noted above   reviewed  Will refill meds  Follow with cards  Check us for claudication symptoms  Labs, radiology studies, and procedures/notes from the last 3 months were reviewed.    Risks, benefits, and side effects were discussed with the patient. All questions were answered to the fullest satisfaction of the patient, and pt verbalized understanding and agreement to treatment plan. Pt was to call with any new or worsening symptoms, or present to the ER.      "

## 2022-09-19 PROBLEM — D50.0 IRON DEFICIENCY ANEMIA DUE TO CHRONIC BLOOD LOSS: Status: ACTIVE | Noted: 2022-01-01

## 2022-09-19 NOTE — TELEPHONE ENCOUNTER
Left message with pt's sister for him to call back about his two different test results.     ----- Message from Mathew Murillo MD sent at 9/19/2022 11:08 AM CDT -----  No [facility], please phone review, abnormal results, will need review with Dr. Nance, thanks, Dr. Murillo

## 2022-09-19 NOTE — PROGRESS NOTES
No [facility], please phone review, abnormal results, will need review with Dr. Nance, thanks, Dr. Murillo

## 2022-09-19 NOTE — NURSING NOTE
2 patient identifier name and date of birth confirmed as stated by patient and matched with armband. Informed consent obtained. Dr Murillo here.

## 2022-09-19 NOTE — TELEPHONE ENCOUNTER
Spoke with pt about two different results. He stated he already bought iron pills but he will go get Vitamin C. He requested his rx for ferrous sulfate. He acknowledged his understanding. I also let him know to F/u with Dr. Nance about the lung test.

## 2022-10-27 NOTE — PROGRESS NOTES
" Patient ID: Benedicto Romo is a 67 y.o. male.    Chief Complaint: Follow-up, Fall, and Sinus Problem      Reviewed family, medical, surgical, and social history.    No cp/sob  No change in mental status  No fever  No asymmetrical limb swelling    Objective:      /68 (BP Location: Left arm, Patient Position: Sitting, BP Method: Medium (Manual))   Pulse 89   Resp 15   Ht 5' 8" (1.727 m)   Wt 65.6 kg (144 lb 11.2 oz)   SpO2 96%   BMI 22.00 kg/m²   RRR, CTAB, s/nt/nd, no c/c/e, non-toxic appearing, no focal weakness  Assessment:       1. Depression, unspecified depression type    2. Sinusitis, unspecified chronicity, unspecified location    3. Chronic hepatitis C without hepatic coma    4. Claudication    5. Lung mass    6. Arthralgia, unspecified joint        Plan:       Depression, unspecified depression type  -     sertraline (ZOLOFT) 100 MG tablet; Take 1 tablet (100 mg total) by mouth once daily.  Dispense: 90 tablet; Refill: 11  -     HYDROcodone-acetaminophen (NORCO)  mg per tablet; Take 1 tablet by mouth every 6 (six) hours as needed for Pain.  Dispense: 120 tablet; Refill: 0    Sinusitis, unspecified chronicity, unspecified location  -     dexAMETHasone injection 8 mg  -     cefTRIAXone injection 1 g  -     azithromycin (Z-KULDIP) 250 MG tablet; Take two tablets on day 1, then 1 tablet on days 2-5.  Dispense: 6 tablet; Refill: 0  -     predniSONE (DELTASONE) 20 MG tablet; Take 1 tablet (20 mg total) by mouth 2 (two) times daily. for 5 days  Dispense: 10 tablet; Refill: 0  -     promethazine-dextromethorphan (PROMETHAZINE-DM) 6.25-15 mg/5 mL Syrp; Take 5 mLs by mouth every 6 (six) hours as needed (cough).  Dispense: 240 mL; Refill: 1  -     HYDROcodone-acetaminophen (NORCO)  mg per tablet; Take 1 tablet by mouth every 6 (six) hours as needed for Pain.  Dispense: 120 tablet; Refill: 0    Chronic hepatitis C without hepatic coma  -     Ambulatory referral/consult to Hepatology; Future; " Expected date: 11/03/2022  -     HYDROcodone-acetaminophen (NORCO)  mg per tablet; Take 1 tablet by mouth every 6 (six) hours as needed for Pain.  Dispense: 120 tablet; Refill: 0    Claudication  -     Ambulatory referral/consult to Vascular Surgery; Future; Expected date: 11/03/2022  -     HYDROcodone-acetaminophen (NORCO)  mg per tablet; Take 1 tablet by mouth every 6 (six) hours as needed for Pain.  Dispense: 120 tablet; Refill: 0    Lung mass  -     Ambulatory referral/consult to Pulmonology; Future; Expected date: 11/03/2022  -     HYDROcodone-acetaminophen (NORCO)  mg per tablet; Take 1 tablet by mouth every 6 (six) hours as needed for Pain.  Dispense: 120 tablet; Refill: 0    Arthralgia, unspecified joint  -     HYDROcodone-acetaminophen (NORCO)  mg per tablet; Take 1 tablet by mouth every 6 (six) hours as needed for Pain.  Dispense: 120 tablet; Refill: 0            Continue current medicines, any changes noted above   reviewed  Will place on monthly refill regimen  Labs, radiology studies, and procedures/notes from the last 3 months were reviewed.    Risks, benefits, and side effects were discussed with the patient. All questions were answered to the fullest satisfaction of the patient, and pt verbalized understanding and agreement to treatment plan. Pt was to call with any new or worsening symptoms, or present to the ER.

## 2022-11-04 NOTE — TELEPHONE ENCOUNTER
Dr. Rc Nance ordered that patient be scheduled for a hepatology consult visit.  Patient hep c quant positive.  Attempt made to reach him for scheduling.  LVM asking that he call hepatology back.

## 2022-11-14 NOTE — TELEPHONE ENCOUNTER
Left vm for patient and his sister Sharon informing them Dr Rose next nw pt availability is 7/23. Left call back information.

## 2022-11-14 NOTE — TELEPHONE ENCOUNTER
----- Message from Guanakito Zafar sent at 11/14/2022 12:45 PM CST -----  Regarding: appointment  Contact: Sister, Sharon Herrera want to know if office can possible fit patient in for sooner appointment, only on [facility] patient is afraid of going to [facility], please call back at [phone] (home)

## 2022-11-25 NOTE — TELEPHONE ENCOUNTER
----- Message from Tangela Drake sent at 11/25/2022  2:47 PM CST -----  Regarding: schedule appt  Patient's sister, Justyna, calling to schedule appt on behalf of patient.      Call: [phone] ([place]

## 2022-11-30 NOTE — PROGRESS NOTES
" Patient ID: Benedicto Romo is a 67 y.o. male.    Chief Complaint: Follow-up      Reviewed family, medical, surgical, and social history.    No cp/sob  No change in mental status  No fever  No asymmetrical limb swelling    Objective:      /78 (BP Location: Right arm, Patient Position: Sitting, BP Method: Medium (Manual))   Pulse 78   Resp 16   Ht 5' 8" (1.727 m)   Wt 65.1 kg (143 lb 8 oz)   SpO2 98%   BMI 21.82 kg/m²   RRR, CTAB, s/nt/nd, no c/c/e, non-toxic appearing, no focal weakness  Assessment:       1. Chronic hepatitis C without hepatic coma    2. Arthralgia, unspecified joint    3. Depression, unspecified depression type    4. Upper respiratory tract infection, unspecified type    5. Sinusitis, unspecified chronicity, unspecified location    6. Claudication    7. Lung mass        Plan:       Chronic hepatitis C without hepatic coma  -     HYDROcodone-acetaminophen (NORCO)  mg per tablet; Take 1 tablet by mouth every 6 (six) hours as needed for Pain.  Dispense: 120 tablet; Refill: 0    Arthralgia, unspecified joint  -     HYDROcodone-acetaminophen (NORCO)  mg per tablet; Take 1 tablet by mouth every 6 (six) hours as needed for Pain.  Dispense: 120 tablet; Refill: 0    Depression, unspecified depression type  -     HYDROcodone-acetaminophen (NORCO)  mg per tablet; Take 1 tablet by mouth every 6 (six) hours as needed for Pain.  Dispense: 120 tablet; Refill: 0    Upper respiratory tract infection, unspecified type  -     dexAMETHasone injection 8 mg  -     cyanocobalamin injection 1,000 mcg    Sinusitis, unspecified chronicity, unspecified location  -     HYDROcodone-acetaminophen (NORCO)  mg per tablet; Take 1 tablet by mouth every 6 (six) hours as needed for Pain.  Dispense: 120 tablet; Refill: 0    Claudication  -     HYDROcodone-acetaminophen (NORCO)  mg per tablet; Take 1 tablet by mouth every 6 (six) hours as needed for Pain.  Dispense: 120 tablet; Refill: 0    Lung " mass  -     HYDROcodone-acetaminophen (NORCO)  mg per tablet; Take 1 tablet by mouth every 6 (six) hours as needed for Pain.  Dispense: 120 tablet; Refill: 0            Continue current medicines, any changes noted above   reviewed and consistent  Will refill medicine as shown given that patient receives greater than 30% benefit from medicine and keeps patient functional and able to complete ADL's.  UDS reviewed  Pain contract reviewed  No abuse concerns    Labs, radiology studies, and procedures/notes from the last 3 months were reviewed.    Risks, benefits, and side effects were discussed with the patient. All questions were answered to the fullest satisfaction of the patient, and pt verbalized understanding and agreement to treatment plan. Pt was to call with any new or worsening symptoms, or present to the ER.

## 2022-12-30 NOTE — PROGRESS NOTES
" Patient ID: Benedicto Romo is a 67 y.o. male.    Chief Complaint: Follow-up      Reviewed family, medical, surgical, and social history.    No cp/sob  No change in mental status  No fever  No asymmetrical limb swelling    Objective:      /88   Pulse 71   Resp 15   Ht 5' 8" (1.727 m)   Wt 64.1 kg (141 lb 6.4 oz)   SpO2 96%   BMI 21.50 kg/m²   RRR, CTAB, s/nt/nd, no c/c/e, non-toxic appearing, no focal weakness  Assessment:       1. Primary hypertension    2. Encounter for drug screening    3. Chronic hepatitis C without hepatic coma    4. Arthralgia, unspecified joint          Plan:       Primary hypertension    Encounter for drug screening  -     [facility] Drug Screen    Chronic hepatitis C without hepatic coma    Arthralgia, unspecified joint              Continue current medicines, any changes noted above   reviewed and consistent  Will refill medicine as shown given that patient receives greater than 30% benefit from medicine and keeps patient functional and able to complete ADL's.  UDS reviewed  Pain contract reviewed  No abuse concerns    Labs, radiology studies, and procedures/notes from the last 3 months were reviewed.    Risks, benefits, and side effects were discussed with the patient. All questions were answered to the fullest satisfaction of the patient, and pt verbalized understanding and agreement to treatment plan. Pt was to call with any new or worsening symptoms, or present to the ER.    "

## 2023-01-01 ENCOUNTER — HOSPITAL ENCOUNTER (EMERGENCY)
Facility: HOSPITAL | Age: 68
End: 2023-01-16
Attending: FAMILY MEDICINE
Payer: MEDICARE

## 2023-01-01 VITALS
RESPIRATION RATE: 31 BRPM | BODY MASS INDEX: 21.37 KG/M2 | OXYGEN SATURATION: 66 % | DIASTOLIC BLOOD PRESSURE: 60 MMHG | HEIGHT: 68 IN | HEART RATE: 60 BPM | TEMPERATURE: 94 F | SYSTOLIC BLOOD PRESSURE: 78 MMHG | WEIGHT: 141 LBS

## 2023-01-01 DIAGNOSIS — I46.9 CARDIOPULMONARY ARREST: ICD-10-CM

## 2023-01-01 DIAGNOSIS — R06.03 ACUTE RESPIRATORY DISTRESS: Primary | ICD-10-CM

## 2023-01-01 DIAGNOSIS — R57.9 SHOCK: ICD-10-CM

## 2023-01-01 DIAGNOSIS — R06.02 SOB (SHORTNESS OF BREATH): ICD-10-CM

## 2023-01-01 DIAGNOSIS — I21.3 ST ELEVATION MYOCARDIAL INFARCTION (STEMI), UNSPECIFIED ARTERY: ICD-10-CM

## 2023-01-01 LAB
6MAM UR QL: NOT DETECTED
7AMINOCLONAZEPAM UR QL: NOT DETECTED
A-OH ALPRAZ UR QL: NOT DETECTED
ALBUMIN SERPL BCP-MCNC: 2.7 G/DL (ref 3.5–5.2)
ALLENS TEST: ABNORMAL
ALP SERPL-CCNC: 67 U/L (ref 55–135)
ALPHA-OH-MIDAZOLAM: NOT DETECTED
ALPRAZ UR QL: NOT DETECTED
ALT SERPL W/O P-5'-P-CCNC: 45 U/L (ref 10–44)
AMPHET UR QL SCN: NOT DETECTED
AMPHET+METHAMPHET UR QL: NEGATIVE
ANION GAP SERPL CALC-SCNC: 15 MMOL/L (ref 8–16)
ANNOTATION COMMENT IMP: NORMAL
ANNOTATION COMMENT IMP: NORMAL
AST SERPL-CCNC: 62 U/L (ref 10–40)
BARBITURATES UR QL SCN>200 NG/ML: NEGATIVE
BARBITURATES UR QL: NOT DETECTED
BASOPHILS # BLD AUTO: 0.05 K/UL (ref 0–0.2)
BASOPHILS NFR BLD: 0.4 % (ref 0–1.9)
BENZODIAZ UR QL SCN>200 NG/ML: NEGATIVE
BILIRUB SERPL-MCNC: 0.4 MG/DL (ref 0.1–1)
BILIRUB UR QL STRIP: NEGATIVE
BNP SERPL-MCNC: 1616 PG/ML (ref 0–99)
BUN SERPL-MCNC: 15 MG/DL (ref 8–23)
BUPRENORPHINE UR QL: NOT DETECTED
BZE UR QL SCN: NEGATIVE
BZE UR QL: NOT DETECTED
CALCIUM SERPL-MCNC: 8.6 MG/DL (ref 8.7–10.5)
CANNABINOIDS UR QL SCN: ABNORMAL
CARBOXYTHC UR QL: NOT DETECTED
CARISOPRODOL UR QL: NOT DETECTED
CHLORIDE SERPL-SCNC: 98 MMOL/L (ref 95–110)
CLARITY UR: CLEAR
CLONAZEPAM UR QL: NOT DETECTED
CO2 SERPL-SCNC: 14 MMOL/L (ref 23–29)
CODEINE UR QL: NOT DETECTED
COLOR UR: YELLOW
CREAT SERPL-MCNC: 1.3 MG/DL (ref 0.5–1.4)
CREAT UR-MCNC: 34.8 MG/DL (ref 20–400)
CREAT UR-MCNC: 90.7 MG/DL (ref 23–375)
DELSYS: ABNORMAL
DIAZEPAM UR QL: NOT DETECTED
DIFFERENTIAL METHOD: ABNORMAL
EOSINOPHIL # BLD AUTO: 0.2 K/UL (ref 0–0.5)
EOSINOPHIL NFR BLD: 1.8 % (ref 0–8)
EP: 6
ERYTHROCYTE [DISTWIDTH] IN BLOOD BY AUTOMATED COUNT: 13.1 % (ref 11.5–14.5)
ERYTHROCYTE [SEDIMENTATION RATE] IN BLOOD BY WESTERGREN METHOD: 24 MM/H
EST. GFR  (NO RACE VARIABLE): 59.5 ML/MIN/1.73 M^2
ETHYL GLUCURONIDE UR QL: PRESENT
FENTANYL UR QL: NOT DETECTED
FIO2: 21
GABAPENTIN: PRESENT
GLUCOSE SERPL-MCNC: 269 MG/DL (ref 70–110)
GLUCOSE UR QL STRIP: NEGATIVE
HCO3 UR-SCNC: 21.4 MMOL/L (ref 24–28)
HCT VFR BLD AUTO: 31.2 % (ref 40–54)
HGB BLD-MCNC: 10.5 G/DL (ref 14–18)
HGB UR QL STRIP: NEGATIVE
HYDROCODONE UR QL: PRESENT
HYDROMORPHONE UR QL: PRESENT
IMM GRANULOCYTES # BLD AUTO: 0.38 K/UL (ref 0–0.04)
IMM GRANULOCYTES NFR BLD AUTO: 3.1 % (ref 0–0.5)
INR PPP: 1.9 (ref 0.8–1.2)
IP: 12
KETONES UR QL STRIP: NEGATIVE
LACTATE SERPL-SCNC: 7.4 MMOL/L (ref 0.5–2.2)
LEUKOCYTE ESTERASE UR QL STRIP: NEGATIVE
LORAZEPAM UR QL: NOT DETECTED
LYMPHOCYTES # BLD AUTO: 2.1 K/UL (ref 1–4.8)
LYMPHOCYTES NFR BLD: 17.2 % (ref 18–48)
MCH RBC QN AUTO: 30.8 PG (ref 27–31)
MCHC RBC AUTO-ENTMCNC: 33.7 G/DL (ref 32–36)
MCV RBC AUTO: 92 FL (ref 82–98)
MDA UR QL: NOT DETECTED
MDEA UR QL: NOT DETECTED
MDMA UR QL: NOT DETECTED
ME-PHENIDATE UR QL: NOT DETECTED
METHADONE UR QL SCN>300 NG/ML: NEGATIVE
METHADONE UR QL: NOT DETECTED
METHAMPHET UR QL: NOT DETECTED
MIDAZOLAM UR QL SCN: NOT DETECTED
MODE: ABNORMAL
MONOCYTES # BLD AUTO: 0.5 K/UL (ref 0.3–1)
MONOCYTES NFR BLD: 4.5 % (ref 4–15)
MORPHINE UR QL: NOT DETECTED
NALOXONE: NOT DETECTED
NEUTROPHILS # BLD AUTO: 8.8 K/UL (ref 1.8–7.7)
NEUTROPHILS NFR BLD: 73 % (ref 38–73)
NITRITE UR QL STRIP: NEGATIVE
NORBUPRENORPHINE UR QL CFM: NOT DETECTED
NORDIAZEPAM UR QL: NOT DETECTED
NORFENTANYL UR QL: NOT DETECTED
NORHYDROCODONE UR QL CFM: PRESENT
NORMEPERIDINE UR QL CFM: NOT DETECTED
NOROXYCODONE UR QL CFM: NOT DETECTED
NOROXYMORPHONE UR QL SCN: NOT DETECTED
NRBC BLD-RTO: 0 /100 WBC
OPIATES UR QL SCN: ABNORMAL
OXAZEPAM UR QL: NOT DETECTED
OXYCODONE UR QL: NOT DETECTED
OXYMORPHONE UR QL: NOT DETECTED
PATHOLOGY STUDY: NORMAL
PCO2 BLDA: 55.2 MMHG (ref 35–45)
PCP UR QL SCN>25 NG/ML: NEGATIVE
PCP UR QL: NOT DETECTED
PH SMN: 7.2 [PH] (ref 7.35–7.45)
PH UR STRIP: 7 [PH] (ref 5–8)
PHENTERMINE UR QL: NOT DETECTED
PLATELET # BLD AUTO: 312 K/UL (ref 150–450)
PMV BLD AUTO: 9.2 FL (ref 9.2–12.9)
PO2 BLDA: 22 MMHG (ref 40–60)
POC BE: -7 MMOL/L
POC SATURATED O2: 27 % (ref 95–100)
POC TCO2: 23 MMOL/L (ref 24–29)
POTASSIUM SERPL-SCNC: 6.7 MMOL/L (ref 3.5–5.1)
PREGABALIN: NOT DETECTED
PROT SERPL-MCNC: 6.8 G/DL (ref 6–8.4)
PROT UR QL STRIP: NEGATIVE
PROTHROMBIN TIME: 18.8 SEC (ref 9–12.5)
RBC # BLD AUTO: 3.41 M/UL (ref 4.6–6.2)
SAMPLE: ABNORMAL
SERVICE CMNT-IMP: NORMAL
SITE: ABNORMAL
SODIUM SERPL-SCNC: 127 MMOL/L (ref 136–145)
SP GR UR STRIP: 1.02 (ref 1–1.03)
SP02: 98
SPONT RATE: 26
TAPENTADOL UR QL SCN: NOT DETECTED
TAPENTADOL UR QL SCN: NOT DETECTED
TEMAZEPAM UR QL: NOT DETECTED
TOXICOLOGY INFORMATION: ABNORMAL
TRAMADOL UR QL: NOT DETECTED
TROPONIN I SERPL DL<=0.01 NG/ML-MCNC: 1.19 NG/ML (ref 0–0.03)
URN SPEC COLLECT METH UR: NORMAL
UROBILINOGEN UR STRIP-ACNC: NEGATIVE EU/DL
WBC # BLD AUTO: 12.1 K/UL (ref 3.9–12.7)
ZOLPIDEM METABOLITE: NOT DETECTED
ZOLPIDEM UR QL: NOT DETECTED

## 2023-01-01 PROCEDURE — 85025 COMPLETE CBC W/AUTO DIFF WBC: CPT | Performed by: FAMILY MEDICINE

## 2023-01-01 PROCEDURE — 99900035 HC TECH TIME PER 15 MIN (STAT)

## 2023-01-01 PROCEDURE — 99291 CRITICAL CARE FIRST HOUR: CPT | Mod: 25

## 2023-01-01 PROCEDURE — 80053 COMPREHEN METABOLIC PANEL: CPT | Performed by: FAMILY MEDICINE

## 2023-01-01 PROCEDURE — 80307 DRUG TEST PRSMV CHEM ANLYZR: CPT | Performed by: FAMILY MEDICINE

## 2023-01-01 PROCEDURE — 87040 BLOOD CULTURE FOR BACTERIA: CPT | Performed by: FAMILY MEDICINE

## 2023-01-01 PROCEDURE — 96374 THER/PROPH/DIAG INJ IV PUSH: CPT

## 2023-01-01 PROCEDURE — 25000003 PHARM REV CODE 250

## 2023-01-01 PROCEDURE — 93005 ELECTROCARDIOGRAM TRACING: CPT | Mod: 59

## 2023-01-01 PROCEDURE — 63600175 PHARM REV CODE 636 W HCPCS

## 2023-01-01 PROCEDURE — 85610 PROTHROMBIN TIME: CPT | Performed by: FAMILY MEDICINE

## 2023-01-01 PROCEDURE — 71045 X-RAY EXAM CHEST 1 VIEW: CPT | Mod: 26,,, | Performed by: RADIOLOGY

## 2023-01-01 PROCEDURE — 84484 ASSAY OF TROPONIN QUANT: CPT | Performed by: FAMILY MEDICINE

## 2023-01-01 PROCEDURE — 25000003 PHARM REV CODE 250: Performed by: FAMILY MEDICINE

## 2023-01-01 PROCEDURE — 27000190 HC CPAP FULL FACE MASK W/VALVE

## 2023-01-01 PROCEDURE — 99292 CRITICAL CARE ADDL 30 MIN: CPT

## 2023-01-01 PROCEDURE — 71045 X-RAY EXAM CHEST 1 VIEW: CPT | Mod: TC

## 2023-01-01 PROCEDURE — 93010 ELECTROCARDIOGRAM REPORT: CPT | Mod: ,,, | Performed by: INTERNAL MEDICINE

## 2023-01-01 PROCEDURE — 92950 HEART/LUNG RESUSCITATION CPR: CPT

## 2023-01-01 PROCEDURE — 94002 VENT MGMT INPAT INIT DAY: CPT | Mod: 59

## 2023-01-01 PROCEDURE — 63600175 PHARM REV CODE 636 W HCPCS: Performed by: FAMILY MEDICINE

## 2023-01-01 PROCEDURE — 27100171 HC OXYGEN HIGH FLOW UP TO 24 HOURS

## 2023-01-01 PROCEDURE — 83605 ASSAY OF LACTIC ACID: CPT | Performed by: FAMILY MEDICINE

## 2023-01-01 PROCEDURE — 31500 INSERT EMERGENCY AIRWAY: CPT

## 2023-01-01 PROCEDURE — 93010 EKG 12-LEAD: ICD-10-PCS | Mod: ,,, | Performed by: INTERNAL MEDICINE

## 2023-01-01 PROCEDURE — 83880 ASSAY OF NATRIURETIC PEPTIDE: CPT | Performed by: FAMILY MEDICINE

## 2023-01-01 PROCEDURE — 71045 XR CHEST AP PORTABLE: ICD-10-PCS | Mod: 26,76,, | Performed by: RADIOLOGY

## 2023-01-01 PROCEDURE — 94660 CPAP INITIATION&MGMT: CPT | Mod: XB

## 2023-01-01 PROCEDURE — 81003 URINALYSIS AUTO W/O SCOPE: CPT | Mod: 59 | Performed by: FAMILY MEDICINE

## 2023-01-01 PROCEDURE — 36556 INSERT NON-TUNNEL CV CATH: CPT

## 2023-01-01 RX ORDER — ATROPINE SULFATE 0.1 MG/ML
0.5 INJECTION INTRAVENOUS
Status: COMPLETED | OUTPATIENT
Start: 2023-01-01 | End: 2023-01-01

## 2023-01-01 RX ORDER — MIDAZOLAM HYDROCHLORIDE 1 MG/ML
5 INJECTION INTRAMUSCULAR; INTRAVENOUS
Status: DISCONTINUED | OUTPATIENT
Start: 2023-01-01 | End: 2023-01-01 | Stop reason: HOSPADM

## 2023-01-01 RX ORDER — NALOXONE HCL 0.4 MG/ML
VIAL (ML) INJECTION
Status: COMPLETED
Start: 2023-01-01 | End: 2023-01-01

## 2023-01-01 RX ORDER — SODIUM BICARBONATE 1 MEQ/ML
SYRINGE (ML) INTRAVENOUS CODE/TRAUMA/SEDATION MEDICATION
Status: COMPLETED | OUTPATIENT
Start: 2023-01-01 | End: 2023-01-01

## 2023-01-01 RX ORDER — EPINEPHRINE 0.1 MG/ML
INJECTION INTRAVENOUS CODE/TRAUMA/SEDATION MEDICATION
Status: COMPLETED | OUTPATIENT
Start: 2023-01-01 | End: 2023-01-01

## 2023-01-01 RX ORDER — NALOXONE HCL 0.4 MG/ML
0.4 VIAL (ML) INJECTION
Status: COMPLETED | OUTPATIENT
Start: 2023-01-01 | End: 2023-01-01

## 2023-01-01 RX ORDER — DOPAMINE HYDROCHLORIDE 160 MG/100ML
INJECTION, SOLUTION INTRAVENOUS
Status: COMPLETED
Start: 2023-01-01 | End: 2023-01-01

## 2023-01-01 RX ORDER — MIDAZOLAM HYDROCHLORIDE 1 MG/ML
INJECTION INTRAMUSCULAR; INTRAVENOUS
Status: COMPLETED
Start: 2023-01-01 | End: 2023-01-01

## 2023-01-01 RX ORDER — METHYLPREDNISOLONE SOD SUCC 125 MG
125 VIAL (EA) INJECTION
Status: DISCONTINUED | OUTPATIENT
Start: 2023-01-01 | End: 2023-01-01

## 2023-01-01 RX ORDER — DOPAMINE HYDROCHLORIDE 160 MG/100ML
10 INJECTION, SOLUTION INTRAVENOUS CONTINUOUS
Status: DISCONTINUED | OUTPATIENT
Start: 2023-01-01 | End: 2023-01-01 | Stop reason: HOSPADM

## 2023-01-01 RX ORDER — SUCCINYLCHOLINE CHLORIDE 20 MG/ML
INJECTION INTRAMUSCULAR; INTRAVENOUS
Status: COMPLETED
Start: 2023-01-01 | End: 2023-01-01

## 2023-01-01 RX ORDER — MIDAZOLAM HYDROCHLORIDE 1 MG/ML
2 INJECTION INTRAMUSCULAR; INTRAVENOUS
Status: DISCONTINUED | OUTPATIENT
Start: 2023-01-01 | End: 2023-01-01

## 2023-01-01 RX ORDER — LIDOCAINE HYDROCHLORIDE 10 MG/ML
5 INJECTION, SOLUTION EPIDURAL; INFILTRATION; INTRACAUDAL; PERINEURAL
Status: DISCONTINUED | OUTPATIENT
Start: 2023-01-01 | End: 2023-01-01 | Stop reason: HOSPADM

## 2023-01-01 RX ADMIN — DOPAMINE HYDROCHLORIDE 10 MCG/KG/MIN: 160 INJECTION, SOLUTION INTRAVENOUS at 09:01

## 2023-01-01 RX ADMIN — MIDAZOLAM HYDROCHLORIDE 5 MG: 1 INJECTION, SOLUTION INTRAMUSCULAR; INTRAVENOUS at 09:01

## 2023-01-01 RX ADMIN — NALOXONE HYDROCHLORIDE 0.4 MG: 0.4 INJECTION, SOLUTION INTRAMUSCULAR; INTRAVENOUS; SUBCUTANEOUS at 08:01

## 2023-01-01 RX ADMIN — ATROPINE SULFATE 0.5 MG: 0.1 INJECTION INTRAVENOUS at 09:01

## 2023-01-01 RX ADMIN — SUCCINYLCHOLINE CHLORIDE 20 MG: 20 INJECTION, SOLUTION INTRAMUSCULAR; INTRAVENOUS at 09:01

## 2023-01-01 RX ADMIN — EPINEPHRINE 0.1 MG: 0.1 INJECTION INTRACARDIAC; INTRAVENOUS at 09:01

## 2023-01-01 RX ADMIN — EPINEPHRINE 1 MG: 0.1 INJECTION INTRACARDIAC; INTRAVENOUS at 09:01

## 2023-01-01 RX ADMIN — DOPAMINE HYDROCHLORIDE IN DEXTROSE 10 MCG/KG/MIN: 1.6 INJECTION, SOLUTION INTRAVENOUS at 09:01

## 2023-01-01 RX ADMIN — NALOXONE HYDROCHLORIDE 0.4 MG: 0.4 INJECTION, SOLUTION INTRAMUSCULAR; INTRAVENOUS; SUBCUTANEOUS at 09:01

## 2023-01-01 RX ADMIN — SODIUM CHLORIDE 1000 ML: 9 INJECTION, SOLUTION INTRAVENOUS at 08:01

## 2023-01-01 RX ADMIN — SODIUM BICARBONATE 50 MEQ: 84 INJECTION, SOLUTION INTRAVENOUS at 10:01

## 2023-01-01 RX ADMIN — SODIUM CHLORIDE 1000 ML: 9 INJECTION, SOLUTION INTRAVENOUS at 09:01

## 2023-01-16 NOTE — ED NOTES
Pt moved to er #1  using ambu bag assisting with pts ventilations . Dr. Mora located, and is at bedside. Unable to obtain peripheral iv access IO placed by Jp Huitron rn to left lower leg, good blood return after insertion.

## 2023-01-16 NOTE — ED PROVIDER NOTES
Encounter Date: 1/15/2023       History     Chief Complaint   Patient presents with    Shortness of Breath     Pt to [facility] by [facility] with c/o SOB that started earlier today. Pt was found with room air sat of 54% upon [facility] arrival. Pt arrived on 8L O2 and sats were at 94%. Pt was discharged from [facility] earlier today with a diagnosis of Pneumonia.      67-year-old male presents in distress per EMS, I was pulled out of another patient's  room while in a surgical procedure  to see the patient shortly after his arrival, patient initially had low oxygen saturation which responded to oxygenation and BiPAP however his blood pressure readings were erratic, peripheral lines were immediately attempted without success, IO was placed in the left harrell per nurse Jp with good aspiration of bone marrow and good flow of the 100 cc bolus of normal saline, patient vitals responded only slightly and his skin remained mottled, however the patient continued to speak to us, central line was placed in the left subclavian however the patient's pulse continued to deteriorate centrally and was barely auscultated per Doppler at the left groin, decision to begin CPR was made, IV inotrope/chronotrope  dopamine used through the central line after atropine was required 0.5 mg IV for the patient's episode of bradycardia with a weak pulse, patient continued to have a wide bundle branch block QRS on his monitor this was confirmed by his EKG and this was a change from prior EKG done here, this was considered possibly indicating possible coronary event, the patient endotracheally intubated at 9:32 p.m., central line was placed at 9:17 p.m. on a 2nd try, x-ray shows appropriate placement of central line no pneumothoraces and later film showed appropriate placement of the ET tube, on my initial evaluation the patient was breathless but able to give one-word answers    Review of patient's allergies indicates:  No Known Allergies  Past Medical  History:   Diagnosis Date    Constipation     COPD (chronic obstructive pulmonary disease)     HTN (hypertension)     Mild intermittent asthma, uncomplicated     Other pulmonary embolism without acute cor pulmonale     Rheumatoid arthritis, unspecified      Past Surgical History:   Procedure Laterality Date    EYE SURGERY      HAND TENDON SURGERY       History reviewed. No pertinent family history.  Social History     Tobacco Use    Smoking status: Former    Smokeless tobacco: Never   Substance Use Topics    Alcohol use: Never    Drug use: Never     Review of Systems   Constitutional:  Positive for diaphoresis and fatigue. Negative for fever.   Respiratory:  Positive for shortness of breath. Negative for cough.    Cardiovascular:  Negative for chest pain.   Skin:  Negative for rash.   Neurological:  Positive for weakness. Negative for dizziness.     Physical Exam     Initial Vitals   BP Pulse Resp Temp SpO2   01/15/23 2028 01/15/23 2026 01/15/23 2026 01/15/23 2058 01/15/23 2037   (!) 60/46 63 (!) 32 (!) 95 °F (35 °C) 96 %      MAP       --                Physical Exam    Nursing note and vitals reviewed.  Constitutional: He appears well-developed and well-nourished. He is not diaphoretic. He appears distressed.   HENT:   Head: Normocephalic and atraumatic.   Nose: Nose normal.   Mouth/Throat: Oropharynx is clear and moist. No oropharyngeal exudate.   Eyes: EOM are normal. No scleral icterus.   Neck: Neck supple. No tracheal deviation present.   Normal range of motion.  Cardiovascular:  Normal rate, regular rhythm and normal heart sounds.           Pulmonary/Chest: Breath sounds normal. No stridor. He is in respiratory distress.   Abdominal: Abdomen is soft. He exhibits no distension.   Musculoskeletal:         General: No edema. Normal range of motion.      Cervical back: Normal range of motion and neck supple.     Neurological: He is alert and oriented to person, place, and time. He has normal strength.   Skin:  Skin is warm and dry. Capillary refill takes less than 2 seconds. There is pallor.   Mottled       ED Course   Central Line    Date/Time: 1/15/2023 9:17 PM  Performed by: Serafin Mora MD  Authorized by: Serafin Mora MD     Location procedure was performed:  [facility] EMERGENCY DEPARTMENT  Consent Done ?:  Emergent Situation  Indications:  Med administration  Preparation:  Skin prepped with ChloraPrep  Skin prep agent dried: Skin prep agent completely dried prior to procedure    Sterile barriers: All five maximal sterile barriers used - gloves, gown, cap, mask and large sterile sheet    Hand hygiene: Hand hygiene performed immediately prior to central venous catheter insertion    Location:  Left subclavian  Catheter type:  Triple lumen  Catheter size:  7 Fr  Inserted Catheter Length (cm):  16  Ultrasound guidance: No    Manometry: No    Number of attempts:  2  Securement:  Line sutured and blood return through all ports  Complications: No    Specimens: No    Implants: No  Termination Site: superior vena cava  Intubation    Date/Time: 1/15/2023 9:32 PM  Location procedure was performed: [facility] EMERGENCY DEPARTMENT  Performed by: Serafin Mora MD  Authorized by: Serafin Mora MD   Indications: respiratory distress, respiratory failure, airway protection and hypoxemia  Intubation method: direct  Patient status: paralyzed (RSI)  Preoxygenation: BVM  Sedatives: midazolam  Paralytic: succinylcholine  Laryngoscope size: Mac 4  Tube size: 7.5 mm  Tube type: cuffed  Number of attempts: 1  Cricoid pressure: no  Cords visualized: yes  Post-procedure assessment: chest rise, ETCO2 monitor and CO2 detector  Breath sounds: clear  Cuff inflated: yes  ETT to lip: 23 cm  Chest x-ray interpreted by me.  Chest x-ray findings: endotracheal tube in appropriate position  Complications: No  Specimens: No  Implants: No      Critical Care    Date/Time: 1/16/2023 1:06 AM  Performed by: Serafin Mora  MD  Authorized by: Serafin Mora MD   Direct patient critical care time: 25 minutes  Additional history critical care time: 20 minutes  Ordering / reviewing critical care time: 40 minutes  Consult with family critical care time: 20 minutes  Other critical care time: 25 minutes  Total critical care time (exclusive of procedural time) : 130 minutes  Critical care time was exclusive of separately billable procedures and treating other patients.  Critical care was necessary to treat or prevent imminent or life-threatening deterioration of the following conditions: cardiac failure, circulatory failure, metabolic crisis, shock and respiratory failure.  Critical care was time spent personally by me on the following activities: blood draw for specimens, evaluation of patient's response to treatment, examination of patient, obtaining history from patient or surrogate, ordering and performing treatments and interventions, ordering and review of laboratory studies, ordering and review of radiographic studies, pulse oximetry, re-evaluation of patient's condition, review of old charts, vascular access procedures and ventilator management.      Labs Reviewed   DRUG SCREEN PANEL, URINE EMERGENCY - Abnormal; Notable for the following components:       Result Value    Opiate Scrn, Ur Presumptive Positive (*)     THC Presumptive Positive (*)     All other components within normal limits    Narrative:     Preferred Collection Type->Urine, Clean Catch  Specimen Source->Urine   CBC W/ AUTO DIFFERENTIAL - Abnormal; Notable for the following components:    RBC 3.41 (*)     Hemoglobin 10.5 (*)     Hematocrit 31.2 (*)     Immature Granulocytes 3.1 (*)     Gran # (ANC) 8.8 (*)     Immature Grans (Abs) 0.38 (*)     Lymph % 17.2 (*)     All other components within normal limits   COMPREHENSIVE METABOLIC PANEL - Abnormal; Notable for the following components:    Sodium 127 (*)     Potassium 6.7 (*)     CO2 14 (*)     Glucose 269 (*)      Calcium 8.6 (*)     Albumin 2.7 (*)     AST 62 (*)     ALT 45 (*)     eGFR 59.5 (*)     All other components within normal limits    Narrative:     Potassium critical result(s) called and verbal readback obtained from   Casimiro Yates RN.  by TH3 01/15/2023 22:11   TROPONIN I - Abnormal; Notable for the following components:    Troponin I 1.194 (*)     All other components within normal limits   B-TYPE NATRIURETIC PEPTIDE - Abnormal; Notable for the following components:    BNP 1,616 (*)     All other components within normal limits   PROTIME-INR - Abnormal; Notable for the following components:    Prothrombin Time 18.8 (*)     INR 1.9 (*)     All other components within normal limits   LACTIC ACID, PLASMA - Abnormal; Notable for the following components:    Lactate (Lactic Acid) 7.4 (*)     All other components within normal limits    Narrative:       Lactic Acid critical result(s) called and verbal readback obtained   from Jp Huitron RN. by TH3 01/15/2023 22:10   ISTAT PROCEDURE - Abnormal; Notable for the following components:    POC PH 7.196 (*)     POC PCO2 55.2 (*)     POC PO2 22 (*)     POC HCO3 21.4 (*)     POC SATURATED O2 27 (*)     POC TCO2 23 (*)     All other components within normal limits   CULTURE, BLOOD   CULTURE, BLOOD   URINALYSIS, REFLEX TO URINE CULTURE    Narrative:     Preferred Collection Type->Urine, Clean Catch  Specimen Source->Urine     EKG Readings: (Independently Interpreted)   Initial Reading: STEMI. Previous EKG: Compared with most recent EKG Rhythm: Normal Sinus Rhythm. Heart Rate: 62. Ectopy: No Ectopy. Conduction: Normal. ST Segments: Normal ST Segments. T Waves: Normal.   Initial EKG at 8:26 p.m. January 15th shows widening of the QRS complex as compared to EKG September 1, 2022     Imaging Results              X-Ray Chest AP Portable (In process)                      X-Ray Chest AP Portable (In process)                      Medications   LIDOcaine (PF) 10 mg/ml (1%)  "injection 50 mg (has no administration in time range)   midazolam (VERSED) 1 mg/mL injection (has no administration in time range)   succinylcholine (ANECTINE) 20 mg/mL injection (has no administration in time range)   DOPamine 400 mg in dextrose 5 % 250 mL infusion (premix) (15 mcg/kg/min × 64 kg Intravenous Rate/Dose Change 1/15/23 2245)   naloxone (NARCAN) 0.4 mg/mL injection (has no administration in time range)   EPINEPHrine 0.1 mg/mL injection (0.1 mg Intravenous Given 1/15/23 2148)   naloxone 0.4 mg/mL injection 0.4 mg (0.4 mg Intravenous Given 1/15/23 2047)   atropine injection 0.5 mg (0.5 mg Intravenous Given 1/15/23 2137)   sodium chloride 0.9% bolus 1,000 mL 1,000 mL (1,000 mLs Intravenous New Bag 1/15/23 2142)   sodium chloride 0.9% bolus 1,000 mL 1,000 mL (0 mLs Intravenous Stopped 1/15/23 2142)     Medical Decision Making:   ED Management:  Despite fluid resuscitation respiratory support pressor and chronotropic support patient continued to steadily deteriorating, CPR was reinstituted with multiple doses of IV epinephrine, additional doses of Narcan, 1 amp of bicarb given without effect, at this point was apparent that we had reached the point of futility, I spoke to the patient's sister Lyly and told her I did not think anything positive would result from doing further episodes of CPR or shocking him, told her we will continue the respiratory ventilatory support and the IV meds to supportive circulatory system she agreed agreed that she would sign do not resuscitate order  Further history was at the patient had recently been discharged from other [facility]           ED Course as of 01/15/23 2335   Sun Tonio 15, 2023   2211 Patient [WK]   2212 Case discussed with patient's sister Lyyl Bang she was brought to the bedside to see the patient, "I take care of him" discussed with her my opinion that further CPR efforts are futile, she agrees he would not want this done and she is willing to sign a " DNR order as his next of kin [WK]   2303 Patient has no movement pupils 8 mm fixed dilated pale no auscultated heart movement no response to deep stimuli no identifiable waveform on the cardiac monitor pronounced dead at 2300 [WK]      ED Course User Index  [WK] Serafin Mora MD          The evaluation, management and treatment of this patient involved Critical Care services  amounting to 145  minutes of direct involvement.  This time was exclusive of any billable procedures.        Clinical Impression:   Final diagnoses:  [R06.02] SOB (shortness of breath)  [R06.03] Acute respiratory distress (Primary)  [R57.9] Shock  [I21.3] ST elevation myocardial infarction (STEMI), unspecified artery  [I46.9] Cardiopulmonary arrest               Serafin Mora MD  01/16/23 0109       Serafin Mora MD  01/16/23 0359

## 2023-01-16 NOTE — ED NOTES
Dr. Mora pronounced pt at 2300 [facility] [place] contacted at that time. MELVA called at 2202, spoke with FABY Oglesby, states pt is not a candidate for donation. Reference number [id number].  case number is [id number]-,  arrived at 2310.

## 2023-01-16 NOTE — ED NOTES
Sister remains at bedside. Asystole on monitor. No pulse per doppler and unable to obtain Blood pressure. Time of death called per MD. All fluids stopped and ventilator turned off.

## 2023-01-16 NOTE — ED TRIAGE NOTES
Pt to er #6 per [facility], picked up bogdan family attempting to drive pt to the ed. Pt with recent(this afternoon) from [facility] . Pt with hx of copd resp at bedside4

## 2023-01-21 LAB
BACTERIA BLD CULT: NORMAL
BACTERIA BLD CULT: NORMAL